# Patient Record
Sex: FEMALE | Race: BLACK OR AFRICAN AMERICAN | Employment: OTHER | URBAN - METROPOLITAN AREA
[De-identification: names, ages, dates, MRNs, and addresses within clinical notes are randomized per-mention and may not be internally consistent; named-entity substitution may affect disease eponyms.]

---

## 2017-01-20 ENCOUNTER — HOSPITAL ENCOUNTER (EMERGENCY)
Facility: HOSPITAL | Age: 52
Discharge: HOME | End: 2017-01-20
Attending: EMERGENCY MEDICINE
Payer: MEDICARE

## 2017-01-20 VITALS
OXYGEN SATURATION: 99 % | HEIGHT: 64.5 IN | TEMPERATURE: 98 F | RESPIRATION RATE: 16 BRPM | DIASTOLIC BLOOD PRESSURE: 78 MMHG | BODY MASS INDEX: 40.47 KG/M2 | HEART RATE: 68 BPM | SYSTOLIC BLOOD PRESSURE: 122 MMHG | WEIGHT: 240 LBS
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
## 2017-01-20 DIAGNOSIS — M54.50 BACK PAIN, LUMBOSACRAL: Primary | ICD-10-CM

## 2017-01-20 LAB
HBV CORE AB SERPL QL IA: NONREACTIVE
HBV SURFACE AB SER-ACNC: 4.46 MIU/ML (ref ?–10)
HBV SURFACE AG SERPL QL IA: NONREACTIVE
HBV SURFACE AG SERPL QL IA: NONREACTIVE
HIV1+2 AB SERPL QL IA: NONREACTIVE

## 2017-01-20 PROCEDURE — 87340 HEPATITIS B SURFACE AG IA: CPT | Performed by: EMERGENCY MEDICINE

## 2017-01-20 PROCEDURE — 36415 COLL VENOUS BLD VENIPUNCTURE: CPT

## 2017-01-20 PROCEDURE — 87389 HIV-1 AG W/HIV-1&-2 AB AG IA: CPT | Performed by: EMERGENCY MEDICINE

## 2017-01-20 PROCEDURE — 86704 HEP B CORE ANTIBODY TOTAL: CPT | Performed by: EMERGENCY MEDICINE

## 2017-01-20 PROCEDURE — 87491 CHLMYD TRACH DNA AMP PROBE: CPT | Performed by: EMERGENCY MEDICINE

## 2017-01-20 PROCEDURE — 87591 N.GONORRHOEAE DNA AMP PROB: CPT | Performed by: EMERGENCY MEDICINE

## 2017-01-20 PROCEDURE — 99285 EMERGENCY DEPT VISIT HI MDM: CPT

## 2017-01-20 PROCEDURE — 86706 HEP B SURFACE ANTIBODY: CPT | Performed by: EMERGENCY MEDICINE

## 2017-01-20 PROCEDURE — 80500 HEPATITIS B PROFILE: CPT | Performed by: EMERGENCY MEDICINE

## 2017-01-20 PROCEDURE — 86780 TREPONEMA PALLIDUM: CPT | Performed by: EMERGENCY MEDICINE

## 2017-01-20 RX ORDER — METHYLPREDNISOLONE 4 MG/1
TABLET ORAL
Qty: 1 PACKAGE | Refills: 0 | Status: SHIPPED | OUTPATIENT
Start: 2017-01-20

## 2017-01-20 RX ORDER — HYDROCODONE BITARTRATE AND ACETAMINOPHEN 5; 325 MG/1; MG/1
1-2 TABLET ORAL EVERY 6 HOURS PRN
Qty: 15 TABLET | Refills: 0 | Status: SHIPPED | OUTPATIENT
Start: 2017-01-20

## 2017-01-20 RX ORDER — CYCLOBENZAPRINE HCL 10 MG
10 TABLET ORAL 3 TIMES DAILY PRN
Qty: 20 TABLET | Refills: 0 | Status: SHIPPED | OUTPATIENT
Start: 2017-01-20 | End: 2017-01-27
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
## 2017-01-20 NOTE — ED NOTES
Labs drawnb and sent. Urine tox consent signed by patient. Discharge instructions explained to Pt. Pt states understanding. Denies questions.
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
## 2017-01-22 LAB
C TRACH DNA SPEC QL NAA+PROBE: NEGATIVE
N GONORRHOEA DNA SPEC QL NAA+PROBE: NEGATIVE
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
## 2017-01-23 LAB — T PALLIDUM AB SER QL: NEGATIVE

## 2017-02-08 ENCOUNTER — HOSPITAL ENCOUNTER (OUTPATIENT)
Age: 52
Discharge: ANOTHER HOSPITAL | End: 2017-02-08
Payer: MEDICARE

## 2017-02-08 VITALS
WEIGHT: 230 LBS | RESPIRATION RATE: 22 BRPM | BODY MASS INDEX: 39.27 KG/M2 | HEIGHT: 64 IN | DIASTOLIC BLOOD PRESSURE: 52 MMHG | TEMPERATURE: 98 F | OXYGEN SATURATION: 99 % | HEART RATE: 59 BPM | SYSTOLIC BLOOD PRESSURE: 140 MMHG

## 2017-02-08 DIAGNOSIS — R10.9 ABDOMINAL PAIN, ACUTE: Primary | ICD-10-CM

## 2017-02-08 PROCEDURE — 99215 OFFICE O/P EST HI 40 MIN: CPT

## 2017-02-08 NOTE — ED INITIAL ASSESSMENT (HPI)
2 days of \"grayish\" vaginal discharge with foul odor. Also c/o LLQ pain with chills. No fever. C/o urinary frequency. Denies dysuria. No nausea or vomiting. Hx of IBS but having an increase in symptoms.
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
## 2018-05-15 NOTE — CM/SW NOTE
Dr Laquita Medrano from [facility] and LUCITA Cardoza sent over a request for medical records. Once received, requested records faxed over for continuity of care.

## 2020-07-13 ENCOUNTER — HOSPITAL ENCOUNTER (EMERGENCY)
Facility: HOSPITAL | Age: 55
Discharge: LEFT BEFORE DISCHARGE | End: 2020-07-13
Attending: EMERGENCY MEDICINE
Payer: MEDICARE

## 2020-07-13 VITALS
WEIGHT: 193 LBS | BODY MASS INDEX: 32.95 KG/M2 | SYSTOLIC BLOOD PRESSURE: 150 MMHG | OXYGEN SATURATION: 100 % | TEMPERATURE: 97 F | DIASTOLIC BLOOD PRESSURE: 83 MMHG | RESPIRATION RATE: 18 BRPM | HEIGHT: 64 IN | HEART RATE: 46 BPM
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
## 2020-09-08 ENCOUNTER — HOSPITAL ENCOUNTER (EMERGENCY)
Facility: HOSPITAL | Age: 55
Discharge: HOME | End: 2020-09-09
Payer: MEDICARE

## 2020-09-08 ENCOUNTER — APPOINTMENT (OUTPATIENT)
Dept: GENERAL RADIOLOGY | Facility: HOSPITAL | Age: 55
End: 2020-09-08
Payer: MEDICARE

## 2020-09-08 DIAGNOSIS — R07.9 CHEST PAIN, UNSPECIFIED TYPE: Primary | ICD-10-CM

## 2020-09-08 DIAGNOSIS — J02.0 STREPTOCOCCAL SORE THROAT: ICD-10-CM

## 2020-09-08 LAB
BASOPHILS # BLD AUTO: 0.05 X10(3) UL (ref 0–0.2)
BASOPHILS NFR BLD AUTO: 0.7 %
DEPRECATED RDW RBC AUTO: 44.9 FL (ref 35.1–46.3)
EOSINOPHIL # BLD AUTO: 0.05 X10(3) UL (ref 0–0.7)
EOSINOPHIL NFR BLD AUTO: 0.7 %
ERYTHROCYTE [DISTWIDTH] IN BLOOD BY AUTOMATED COUNT: 14.6 % (ref 11–15)
HCT VFR BLD AUTO: 41.1 % (ref 35–48)
HGB BLD-MCNC: 14 G/DL (ref 12–16)
IMM GRANULOCYTES # BLD AUTO: 0.02 X10(3) UL (ref 0–1)
IMM GRANULOCYTES NFR BLD: 0.3 %
LYMPHOCYTES # BLD AUTO: 2.61 X10(3) UL (ref 1–4)
LYMPHOCYTES NFR BLD AUTO: 35.8 %
MCH RBC QN AUTO: 28.8 PG (ref 26–34)
MCHC RBC AUTO-ENTMCNC: 34.1 G/DL (ref 31–37)
MCV RBC AUTO: 84.6 FL (ref 80–100)
MONOCYTES # BLD AUTO: 0.41 X10(3) UL (ref 0.1–1)
MONOCYTES NFR BLD AUTO: 5.6 %
NEUTROPHILS # BLD AUTO: 4.15 X10 (3) UL (ref 1.5–7.7)
NEUTROPHILS # BLD AUTO: 4.15 X10(3) UL (ref 1.5–7.7)
NEUTROPHILS NFR BLD AUTO: 56.9 %
PLATELET # BLD AUTO: 264 10(3)UL (ref 150–450)
RBC # BLD AUTO: 4.86 X10(6)UL (ref 3.8–5.3)
WBC # BLD AUTO: 7.3 X10(3) UL (ref 4–11)

## 2020-09-08 PROCEDURE — 96360 HYDRATION IV INFUSION INIT: CPT

## 2020-09-08 PROCEDURE — 85025 COMPLETE CBC W/AUTO DIFF WBC: CPT

## 2020-09-08 PROCEDURE — 93005 ELECTROCARDIOGRAM TRACING: CPT

## 2020-09-08 PROCEDURE — 93010 ELECTROCARDIOGRAM REPORT: CPT | Performed by: INTERNAL MEDICINE

## 2020-09-08 PROCEDURE — 99284 EMERGENCY DEPT VISIT MOD MDM: CPT

## 2020-09-08 PROCEDURE — 80048 BASIC METABOLIC PNL TOTAL CA: CPT

## 2020-09-08 PROCEDURE — 71045 X-RAY EXAM CHEST 1 VIEW: CPT

## 2020-09-08 PROCEDURE — 84484 ASSAY OF TROPONIN QUANT: CPT

## 2020-09-08 RX ORDER — MULTIVIT-MIN/IRON/FOLIC ACID/K 18-600-40
CAPSULE ORAL
COMMUNITY

## 2020-09-08 RX ORDER — ONDANSETRON 2 MG/ML
4 INJECTION INTRAMUSCULAR; INTRAVENOUS ONCE
Status: DISCONTINUED | OUTPATIENT
Start: 2020-09-08 | End: 2020-09-09

## 2020-09-08 RX ORDER — ASPIRIN 81 MG/1
81 TABLET, CHEWABLE ORAL DAILY
COMMUNITY

## 2020-09-09 VITALS
DIASTOLIC BLOOD PRESSURE: 60 MMHG | WEIGHT: 193 LBS | RESPIRATION RATE: 18 BRPM | OXYGEN SATURATION: 98 % | TEMPERATURE: 98 F | SYSTOLIC BLOOD PRESSURE: 115 MMHG | HEIGHT: 64 IN | BODY MASS INDEX: 32.95 KG/M2 | HEART RATE: 52 BPM

## 2020-09-09 LAB
ANION GAP SERPL CALC-SCNC: 8 MMOL/L (ref 0–18)
BUN BLD-MCNC: 6 MG/DL (ref 7–18)
BUN/CREAT SERPL: 7.4 (ref 10–20)
CALCIUM BLD-MCNC: 9.7 MG/DL (ref 8.5–10.1)
CHLORIDE SERPL-SCNC: 109 MMOL/L (ref 98–112)
CO2 SERPL-SCNC: 24 MMOL/L (ref 21–32)
CREAT BLD-MCNC: 0.81 MG/DL (ref 0.55–1.02)
GLUCOSE BLD-MCNC: 75 MG/DL (ref 70–99)
OSMOLALITY SERPL CALC.SUM OF ELEC: 288 MOSM/KG (ref 275–295)
POTASSIUM SERPL-SCNC: 3 MMOL/L (ref 3.5–5.1)
S PYO AG THROAT QL: POSITIVE
SODIUM SERPL-SCNC: 141 MMOL/L (ref 136–145)
TROPONIN I SERPL-MCNC: <0.045 NG/ML (ref ?–0.04)
TROPONIN I SERPL-MCNC: <0.045 NG/ML (ref ?–0.04)

## 2020-09-09 PROCEDURE — 93010 ELECTROCARDIOGRAM REPORT: CPT | Performed by: NURSE PRACTITIONER

## 2020-09-09 PROCEDURE — 84484 ASSAY OF TROPONIN QUANT: CPT | Performed by: NURSE PRACTITIONER

## 2020-09-09 PROCEDURE — 93005 ELECTROCARDIOGRAM TRACING: CPT

## 2020-09-09 PROCEDURE — 87430 STREP A AG IA: CPT

## 2020-09-09 RX ORDER — AMOXICILLIN 500 MG/1
500 TABLET, FILM COATED ORAL 2 TIMES DAILY
Qty: 20 TABLET | Refills: 0 | Status: SHIPPED | OUTPATIENT
Start: 2020-09-09 | End: 2020-09-19

## 2020-09-09 NOTE — ED NOTES
Discharge instruction given to pt, pt verbalized understanding. All questions answered. Pt stable at this time.

## 2020-09-09 NOTE — ED PROVIDER NOTES
Patient Seen in: [facility] AND [facility] Emergency Department      History   Patient presents with:  Chest Pain Angina    Stated Complaint: chest pain    54yo/f with hx of bradycardia, hyperlipidemia, IBS, CAD, migraines reports to the ED with 1 day of chest p and reactive to light. Neck:      Musculoskeletal: Normal range of motion and neck supple. Cardiovascular:      Rate and Rhythm: Normal rate and regular rhythm. Heart sounds: Normal heart sounds.    Pulmonary:      Effort: Pulmonary effort is walter [id number]  Physician:  NONSTAFF  YOB: 1965    Past Medical History (entered by Technologist):  History of bradycardia. Reason For Exam (entered by Technologist):  SOB and chest pain tonight.   Other Notes (entered by Technologist): er obtain basic health screening including reassessment of your blood pressure.       Medications Prescribed:  Discharge Medication List as of 9/9/2020  1:50 AM    START taking these medications    amoxicillin 500 MG Oral Tab  Take 1 tablet (500 mg total) by m

## 2020-09-09 NOTE — ED PROVIDER NOTES
Patient signed out to me by Faraz Done patient's second troponin EKG within normal limits. Patient tested positive for strep pharyngitis. Patient sent home with a prescription of amoxicillin to take for the next 10 days.

## 2021-10-13 ENCOUNTER — HOSPITAL ENCOUNTER (EMERGENCY)
Facility: HOSPITAL | Age: 56
Discharge: HOME | End: 2021-10-13
Payer: MEDICARE

## 2021-10-13 VITALS
TEMPERATURE: 98 F | HEIGHT: 65 IN | OXYGEN SATURATION: 100 % | WEIGHT: 189 LBS | DIASTOLIC BLOOD PRESSURE: 79 MMHG | RESPIRATION RATE: 20 BRPM | HEART RATE: 50 BPM | BODY MASS INDEX: 31.49 KG/M2 | SYSTOLIC BLOOD PRESSURE: 133 MMHG

## 2021-10-13 DIAGNOSIS — H92.02 LEFT EAR PAIN: Primary | ICD-10-CM

## 2021-10-13 DIAGNOSIS — J02.9 SORE THROAT: ICD-10-CM

## 2021-10-13 PROCEDURE — 99282 EMERGENCY DEPT VISIT SF MDM: CPT

## 2021-10-13 PROCEDURE — 87880 STREP A ASSAY W/OPTIC: CPT

## 2021-10-13 RX ORDER — ACETAMINOPHEN 500 MG
1000 TABLET ORAL ONCE
Status: COMPLETED | OUTPATIENT
Start: 2021-10-13 | End: 2021-10-13
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
## 2022-03-15 ENCOUNTER — APPOINTMENT (OUTPATIENT)
Dept: CT IMAGING | Facility: HOSPITAL | Age: 57
End: 2022-03-15
Attending: EMERGENCY MEDICINE
Payer: MEDICARE

## 2022-03-15 PROBLEM — F25.9 SCHIZOAFFECTIVE DISORDER, SUBCHRONIC CONDITION WITH ACUTE EXACERBATION (HCC): Status: ACTIVE | Noted: 2022-03-15

## 2022-03-15 PROCEDURE — 70450 CT HEAD/BRAIN W/O DYE: CPT | Performed by: EMERGENCY MEDICINE

## 2022-03-15 NOTE — ED QUICK NOTES
Raine LYMAN spoke with Heidi [phone], [employer] and confirmed that [employer] will be sent out to  kit.

## 2022-03-15 NOTE — ED PROVIDER NOTES
Patient endorsed to me in setting of paranoia. Pt became agitated and unable to de-escalate verbally. Pt given IM zyprexa. Awaiting psychiatric transfer.

## 2022-03-15 NOTE — ED QUICK NOTES
Dr Munguia made aware that pt did not eat any breakfast or lunch.  Daughter Khoa Grier is also aware that her mother is not eating and drinking, Tonio is coming to spend dinner with her mother
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
## 2022-03-15 NOTE — ED QUICK NOTES
Pt agitated after being made aware of plan to transfer. Pt given Zyprexa. Pt placed in hard restraints. Security and staff at bedside. Family asked to go in waiting area by LUCITA Cheng [employer]ighb[employer] & family declined.
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
## 2022-03-16 PROBLEM — F33.3 SEVERE RECURRENT MAJOR DEPRESSION WITH PSYCHOTIC FEATURES (HCC): Status: ACTIVE | Noted: 2022-03-16

## 2022-03-16 PROBLEM — F20.0 SCHIZOPHRENIA, PARANOID (HCC): Status: ACTIVE | Noted: 2022-03-16

## 2022-03-16 NOTE — ED QUICK NOTES
Attempted to give report to [facility] nurse, but she was unavailable. Awaiting call back. Nurse ext.  [phone]

## 2022-03-16 NOTE — ED QUICK NOTES
Report given to [employer] at [facility]. Accepting physician DR. Weir. Daughters updated on plan.

## 2022-03-16 NOTE — ED PROVIDER NOTES
Patient endorsed to me from previous physician. Reported [facility] psychiatrist request CT scan of head be performed. CT BRAIN OR HEAD (35254)    Result Date: 3/15/2022  CONCLUSION:  1. No acute intracranial process. 2.  Mild inflammation in the left maxillary sinus. 3.  Atherosclerosis in the anterior and posterior circulations.   Dictated by (CST): Dayana Tanner MD on 3/15/2022 at 6:52 PM     Finalized by (CST): Dayana Tanner MD on 3/15/2022 at 6:54 PM

## 2022-03-19 PROBLEM — R00.1 SINUS BRADYCARDIA: Status: ACTIVE | Noted: 2022-03-19

## 2022-04-02 PROBLEM — F33.3 SEVERE RECURRENT MAJOR DEPRESSION WITH PSYCHOTIC FEATURES (HCC): Status: RESOLVED | Noted: 2022-03-16 | Resolved: 2022-04-02

## 2022-04-02 PROBLEM — F25.9 SCHIZOAFFECTIVE DISORDER, SUBCHRONIC CONDITION WITH ACUTE EXACERBATION (HCC): Chronic | Status: ACTIVE | Noted: 2022-03-15

## 2022-08-17 ENCOUNTER — HOSPITAL ENCOUNTER (EMERGENCY)
Facility: HOSPITAL | Age: 57
Discharge: HOME | End: 2022-08-17
Attending: EMERGENCY MEDICINE
Payer: MEDICARE

## 2022-08-17 VITALS
HEART RATE: 68 BPM | BODY MASS INDEX: 34.15 KG/M2 | OXYGEN SATURATION: 100 % | WEIGHT: 200 LBS | HEIGHT: 64 IN | DIASTOLIC BLOOD PRESSURE: 74 MMHG | SYSTOLIC BLOOD PRESSURE: 135 MMHG | TEMPERATURE: 98 F | RESPIRATION RATE: 15 BRPM

## 2022-08-17 DIAGNOSIS — R23.8 VESICLE OF SKIN: Primary | ICD-10-CM

## 2022-08-17 PROCEDURE — 99283 EMERGENCY DEPT VISIT LOW MDM: CPT

## 2022-08-17 NOTE — ED INITIAL ASSESSMENT (HPI)
Pt c/o of rashes and blisters on BLE and back after working in her back yard yesterday. No respiratory symptoms.

## 2023-03-27 ENCOUNTER — HOSPITAL ENCOUNTER (OUTPATIENT)
Age: 58
Discharge: ANOTHER HOSPITAL | End: 2023-03-27
Payer: MEDICARE

## 2023-03-27 ENCOUNTER — HOSPITAL ENCOUNTER (EMERGENCY)
Facility: HOSPITAL | Age: 58
Discharge: HOME | End: 2023-03-27
Payer: MEDICARE

## 2023-03-27 ENCOUNTER — APPOINTMENT (OUTPATIENT)
Dept: CT IMAGING | Facility: HOSPITAL | Age: 58
End: 2023-03-27
Attending: NURSE PRACTITIONER
Payer: MEDICARE

## 2023-03-27 VITALS
TEMPERATURE: 99 F | DIASTOLIC BLOOD PRESSURE: 74 MMHG | BODY MASS INDEX: 32.44 KG/M2 | WEIGHT: 190 LBS | HEIGHT: 64 IN | SYSTOLIC BLOOD PRESSURE: 130 MMHG | OXYGEN SATURATION: 98 % | RESPIRATION RATE: 15 BRPM | HEART RATE: 42 BPM

## 2023-03-27 VITALS
DIASTOLIC BLOOD PRESSURE: 83 MMHG | OXYGEN SATURATION: 97 % | RESPIRATION RATE: 16 BRPM | SYSTOLIC BLOOD PRESSURE: 130 MMHG | HEART RATE: 48 BPM | TEMPERATURE: 97 F

## 2023-03-27 DIAGNOSIS — R19.7 DIARRHEA: ICD-10-CM

## 2023-03-27 DIAGNOSIS — R10.32 LLQ ABDOMINAL PAIN: Primary | ICD-10-CM

## 2023-03-27 DIAGNOSIS — R53.83 OTHER FATIGUE: ICD-10-CM

## 2023-03-27 DIAGNOSIS — R10.9 ABDOMINAL PAIN OF UNKNOWN ETIOLOGY: Primary | ICD-10-CM

## 2023-03-27 LAB
ALBUMIN SERPL-MCNC: 3.6 G/DL (ref 3.4–5)
ALBUMIN/GLOB SERPL: 0.9 {RATIO} (ref 1–2)
ALP LIVER SERPL-CCNC: 109 U/L
ALT SERPL-CCNC: 16 U/L
ANION GAP SERPL CALC-SCNC: 5 MMOL/L (ref 0–18)
AST SERPL-CCNC: 14 U/L (ref 15–37)
BASOPHILS # BLD AUTO: 0.06 X10(3) UL (ref 0–0.2)
BASOPHILS NFR BLD AUTO: 1 %
BILIRUB SERPL-MCNC: 0.6 MG/DL (ref 0.1–2)
BILIRUB UR QL: NEGATIVE
BUN BLD-MCNC: 4 MG/DL (ref 7–18)
BUN/CREAT SERPL: 4.3 (ref 10–20)
CALCIUM BLD-MCNC: 9.2 MG/DL (ref 8.5–10.1)
CHLORIDE SERPL-SCNC: 111 MMOL/L (ref 98–112)
CLARITY UR: CLEAR
CO2 SERPL-SCNC: 27 MMOL/L (ref 21–32)
COLOR UR: YELLOW
CREAT BLD-MCNC: 0.92 MG/DL
DEPRECATED RDW RBC AUTO: 45.5 FL (ref 35.1–46.3)
EOSINOPHIL # BLD AUTO: 0.1 X10(3) UL (ref 0–0.7)
EOSINOPHIL NFR BLD AUTO: 1.7 %
ERYTHROCYTE [DISTWIDTH] IN BLOOD BY AUTOMATED COUNT: 14.6 % (ref 11–15)
GFR SERPLBLD BASED ON 1.73 SQ M-ARVRAT: 72 ML/MIN/1.73M2 (ref 60–?)
GLOBULIN PLAS-MCNC: 3.8 G/DL (ref 2.8–4.4)
GLUCOSE BLD-MCNC: 106 MG/DL (ref 70–99)
GLUCOSE UR-MCNC: NORMAL MG/DL
HCT VFR BLD AUTO: 42.5 %
HGB BLD-MCNC: 14.1 G/DL
HYALINE CASTS #/AREA URNS AUTO: PRESENT /LPF
IMM GRANULOCYTES # BLD AUTO: 0.02 X10(3) UL (ref 0–1)
IMM GRANULOCYTES NFR BLD: 0.3 %
KETONES UR-MCNC: NEGATIVE MG/DL
LEUKOCYTE ESTERASE UR QL STRIP.AUTO: NEGATIVE
LIPASE SERPL-CCNC: 51 U/L (ref 13–75)
LYMPHOCYTES # BLD AUTO: 2.38 X10(3) UL (ref 1–4)
LYMPHOCYTES NFR BLD AUTO: 39.6 %
MCH RBC QN AUTO: 28.4 PG (ref 26–34)
MCHC RBC AUTO-ENTMCNC: 33.2 G/DL (ref 31–37)
MCV RBC AUTO: 85.7 FL
MONOCYTES # BLD AUTO: 0.43 X10(3) UL (ref 0.1–1)
MONOCYTES NFR BLD AUTO: 7.2 %
NEUTROPHILS # BLD AUTO: 3.02 X10 (3) UL (ref 1.5–7.7)
NEUTROPHILS # BLD AUTO: 3.02 X10(3) UL (ref 1.5–7.7)
NEUTROPHILS NFR BLD AUTO: 50.2 %
NITRITE UR QL STRIP.AUTO: NEGATIVE
OSMOLALITY SERPL CALC.SUM OF ELEC: 293 MOSM/KG (ref 275–295)
PH UR: 5.5 [PH] (ref 5–8)
PLATELET # BLD AUTO: 271 10(3)UL (ref 150–450)
POCT INFLUENZA A: NEGATIVE
POCT INFLUENZA B: NEGATIVE
POTASSIUM SERPL-SCNC: 3.1 MMOL/L (ref 3.5–5.1)
PROT SERPL-MCNC: 7.4 G/DL (ref 6.4–8.2)
PROT UR-MCNC: 20 MG/DL
RBC # BLD AUTO: 4.96 X10(6)UL
SARS-COV-2 RNA RESP QL NAA+PROBE: NOT DETECTED
SODIUM SERPL-SCNC: 143 MMOL/L (ref 136–145)
SP GR UR STRIP: 1.03 (ref 1–1.03)
UROBILINOGEN UR STRIP-ACNC: NORMAL
WBC # BLD AUTO: 6 X10(3) UL (ref 4–11)

## 2023-03-27 PROCEDURE — 81001 URINALYSIS AUTO W/SCOPE: CPT

## 2023-03-27 PROCEDURE — 85025 COMPLETE CBC W/AUTO DIFF WBC: CPT

## 2023-03-27 PROCEDURE — 96374 THER/PROPH/DIAG INJ IV PUSH: CPT

## 2023-03-27 PROCEDURE — 80053 COMPREHEN METABOLIC PANEL: CPT

## 2023-03-27 PROCEDURE — 83690 ASSAY OF LIPASE: CPT

## 2023-03-27 PROCEDURE — 99285 EMERGENCY DEPT VISIT HI MDM: CPT

## 2023-03-27 PROCEDURE — 99215 OFFICE O/P EST HI 40 MIN: CPT | Performed by: NURSE PRACTITIONER

## 2023-03-27 PROCEDURE — 87502 INFLUENZA DNA AMP PROBE: CPT | Performed by: NURSE PRACTITIONER

## 2023-03-27 PROCEDURE — 96375 TX/PRO/DX INJ NEW DRUG ADDON: CPT

## 2023-03-27 PROCEDURE — 74177 CT ABD & PELVIS W/CONTRAST: CPT | Performed by: NURSE PRACTITIONER

## 2023-03-27 PROCEDURE — 99284 EMERGENCY DEPT VISIT MOD MDM: CPT

## 2023-03-27 PROCEDURE — U0002 COVID-19 LAB TEST NON-CDC: HCPCS | Performed by: NURSE PRACTITIONER

## 2023-03-27 RX ORDER — MORPHINE SULFATE 4 MG/ML
4 INJECTION, SOLUTION INTRAMUSCULAR; INTRAVENOUS ONCE
Status: COMPLETED | OUTPATIENT
Start: 2023-03-27 | End: 2023-03-27

## 2023-03-27 RX ORDER — ONDANSETRON 2 MG/ML
4 INJECTION INTRAMUSCULAR; INTRAVENOUS ONCE
Status: COMPLETED | OUTPATIENT
Start: 2023-03-27 | End: 2023-03-27

## 2023-03-27 NOTE — ED INITIAL ASSESSMENT (HPI)
Patient ambulatory to ED with complaint of left sided abd pain, body aches, NVD, symptoms began a few days ago. Patient is AXOX4.

## 2023-03-27 NOTE — ED INITIAL ASSESSMENT (HPI)
Patient with neck discomfort, runny nose, nausea, diarrhea and feels winded since yesterday    - emesis   - fever    No exposure to children with illness

## 2023-11-13 ENCOUNTER — LAB ENCOUNTER (OUTPATIENT)
Dept: LAB | Age: 58
End: 2023-11-13
Attending: INTERNAL MEDICINE
Payer: MEDICARE

## 2023-11-13 DIAGNOSIS — Z12.11 COLON CANCER SCREENING: ICD-10-CM

## 2023-11-13 DIAGNOSIS — E61.1 IRON DEFICIENCY: ICD-10-CM

## 2023-11-13 DIAGNOSIS — M79.7 FIBROMYALGIA: ICD-10-CM

## 2023-11-13 DIAGNOSIS — F25.8 OTHER SCHIZOAFFECTIVE DISORDERS (HCC): ICD-10-CM

## 2023-11-13 DIAGNOSIS — Z12.31 BREAST CANCER SCREENING BY MAMMOGRAM: ICD-10-CM

## 2023-11-13 DIAGNOSIS — F43.10 PTSD (POST-TRAUMATIC STRESS DISORDER): ICD-10-CM

## 2023-11-13 DIAGNOSIS — F32.3 MAJOR DEPRESSION WITH PSYCHOTIC FEATURES (HCC): ICD-10-CM

## 2023-11-13 DIAGNOSIS — E66.01 MORBID OBESITY (HCC): ICD-10-CM

## 2023-11-13 PROBLEM — J02.9 SORE THROAT: Status: ACTIVE | Noted: 2018-02-21

## 2023-11-13 PROBLEM — E78.00 PURE HYPERCHOLESTEROLEMIA: Status: ACTIVE | Noted: 2020-03-06

## 2023-11-13 PROBLEM — G25.81 RESTLESS LEG SYNDROME: Status: ACTIVE | Noted: 2020-03-06

## 2023-11-13 PROBLEM — D50.9 IRON DEFICIENCY ANEMIA: Status: ACTIVE | Noted: 2020-03-06

## 2023-11-13 PROBLEM — R00.1 BRADYCARDIA: Status: ACTIVE | Noted: 2020-03-06

## 2023-11-13 LAB
ALBUMIN SERPL-MCNC: 4.2 G/DL (ref 3.2–4.8)
ALBUMIN/GLOB SERPL: 1.5 {RATIO} (ref 1–2)
ALP LIVER SERPL-CCNC: 96 U/L
ALT SERPL-CCNC: 28 U/L
ANION GAP SERPL CALC-SCNC: 6 MMOL/L (ref 0–18)
AST SERPL-CCNC: 34 U/L (ref ?–34)
BASOPHILS # BLD AUTO: 0.07 X10(3) UL (ref 0–0.2)
BASOPHILS NFR BLD AUTO: 1.1 %
BILIRUB SERPL-MCNC: 0.4 MG/DL (ref 0.2–1.1)
BUN BLD-MCNC: 11 MG/DL (ref 9–23)
BUN/CREAT SERPL: 11.3 (ref 10–20)
CALCIUM BLD-MCNC: 10 MG/DL (ref 8.7–10.4)
CHLORIDE SERPL-SCNC: 106 MMOL/L (ref 98–112)
CHOLEST SERPL-MCNC: 265 MG/DL (ref ?–200)
CO2 SERPL-SCNC: 28 MMOL/L (ref 21–32)
CREAT BLD-MCNC: 0.97 MG/DL
DEPRECATED RDW RBC AUTO: 49 FL (ref 35.1–46.3)
EGFRCR SERPLBLD CKD-EPI 2021: 67 ML/MIN/1.73M2 (ref 60–?)
EOSINOPHIL # BLD AUTO: 0.11 X10(3) UL (ref 0–0.7)
EOSINOPHIL NFR BLD AUTO: 1.7 %
ERYTHROCYTE [DISTWIDTH] IN BLOOD BY AUTOMATED COUNT: 15.2 % (ref 11–15)
FASTING PATIENT LIPID ANSWER: NO
FASTING STATUS PATIENT QL REPORTED: NO
GLOBULIN PLAS-MCNC: 2.8 G/DL (ref 2.8–4.4)
GLUCOSE BLD-MCNC: 91 MG/DL (ref 70–99)
HCT VFR BLD AUTO: 40.5 %
HDLC SERPL-MCNC: 65 MG/DL (ref 40–59)
HGB BLD-MCNC: 13.2 G/DL
IMM GRANULOCYTES # BLD AUTO: 0.06 X10(3) UL (ref 0–1)
IMM GRANULOCYTES NFR BLD: 0.9 %
LDLC SERPL CALC-MCNC: 178 MG/DL (ref ?–100)
LYMPHOCYTES # BLD AUTO: 2.21 X10(3) UL (ref 1–4)
LYMPHOCYTES NFR BLD AUTO: 34.6 %
MCH RBC QN AUTO: 28.6 PG (ref 26–34)
MCHC RBC AUTO-ENTMCNC: 32.6 G/DL (ref 31–37)
MCV RBC AUTO: 87.9 FL
MONOCYTES # BLD AUTO: 0.71 X10(3) UL (ref 0.1–1)
MONOCYTES NFR BLD AUTO: 11.1 %
NEUTROPHILS # BLD AUTO: 3.22 X10 (3) UL (ref 1.5–7.7)
NEUTROPHILS # BLD AUTO: 3.22 X10(3) UL (ref 1.5–7.7)
NEUTROPHILS NFR BLD AUTO: 50.6 %
NONHDLC SERPL-MCNC: 200 MG/DL (ref ?–130)
OSMOLALITY SERPL CALC.SUM OF ELEC: 289 MOSM/KG (ref 275–295)
PLATELET # BLD AUTO: 292 10(3)UL (ref 150–450)
POTASSIUM SERPL-SCNC: 4 MMOL/L (ref 3.5–5.1)
PROT SERPL-MCNC: 7 G/DL (ref 5.7–8.2)
RBC # BLD AUTO: 4.61 X10(6)UL
SODIUM SERPL-SCNC: 140 MMOL/L (ref 136–145)
TRIGL SERPL-MCNC: 126 MG/DL (ref 30–149)
TSI SER-ACNC: 4.69 MIU/ML (ref 0.55–4.78)
VLDLC SERPL CALC-MCNC: 26 MG/DL (ref 0–30)
WBC # BLD AUTO: 6.4 X10(3) UL (ref 4–11)

## 2023-11-13 PROCEDURE — 36415 COLL VENOUS BLD VENIPUNCTURE: CPT

## 2023-11-13 PROCEDURE — 80061 LIPID PANEL: CPT

## 2023-11-13 PROCEDURE — 84443 ASSAY THYROID STIM HORMONE: CPT

## 2023-11-13 PROCEDURE — 83540 ASSAY OF IRON: CPT

## 2023-11-13 PROCEDURE — 84466 ASSAY OF TRANSFERRIN: CPT

## 2023-11-13 PROCEDURE — 85025 COMPLETE CBC W/AUTO DIFF WBC: CPT

## 2023-11-13 PROCEDURE — 82728 ASSAY OF FERRITIN: CPT

## 2023-11-13 PROCEDURE — 80053 COMPREHEN METABOLIC PANEL: CPT

## 2023-11-14 DIAGNOSIS — E61.1 IRON DEFICIENCY: Primary | ICD-10-CM

## 2023-11-14 LAB
DEPRECATED HBV CORE AB SER IA-ACNC: 78.2 NG/ML
IRON SATN MFR SERPL: 29 %
IRON SERPL-MCNC: 91 UG/DL
TIBC SERPL-MCNC: 317 UG/DL (ref 250–425)
TRANSFERRIN SERPL-MCNC: 213 MG/DL (ref 250–380)

## 2023-11-22 ENCOUNTER — OFFICE VISIT (OUTPATIENT)
Dept: RHEUMATOLOGY | Facility: CLINIC | Age: 58
End: 2023-11-22

## 2023-11-22 VITALS
HEART RATE: 60 BPM | WEIGHT: 211 LBS | HEIGHT: 64 IN | SYSTOLIC BLOOD PRESSURE: 119 MMHG | DIASTOLIC BLOOD PRESSURE: 75 MMHG | BODY MASS INDEX: 36.02 KG/M2

## 2023-11-22 DIAGNOSIS — M79.7 FIBROMYALGIA: Primary | ICD-10-CM

## 2023-11-22 PROCEDURE — 99214 OFFICE O/P EST MOD 30 MIN: CPT | Performed by: INTERNAL MEDICINE

## 2023-11-22 RX ORDER — CYCLOBENZAPRINE HCL 5 MG
5 TABLET ORAL NIGHTLY PRN
Qty: 30 TABLET | Refills: 2 | Status: SHIPPED | OUTPATIENT
Start: 2023-11-22

## 2023-11-22 NOTE — PROGRESS NOTES
[address] [facility] Chaitanya Lam is a 59year old female. ASSESSMENT/PLAN:       ICD-10-CM    1. Fibromyalgia  M79.7 Physical Therapy Referral - [facility]        DISCUSSION:  Patient presents as a new outpatient consultation for diffuse muscle spasms and polyarthralgias. At this time, patient without signs or features of an underlying connective tissue disease nor inflammatory arthritis. Discussed with patient that fibromyalgia requires a multi-disciplinary approach including formal physical therapy and symptoms-based treatment such as muscle relaxants in the case of muscle spasms or gabapentin in the case of neuropathy/radiculopathy. I have also seen trials of various NSAIDs to be helpful for some patients. This patient has a complicated history, given her history of side effects to gabapentin and pregabalin, therefore would avoid these treatment options. Would also avoid NSAIDs given her history of CAD with angioplasty. Discussed formal physical therapy with patient to which she is agreeable. PLAN:  -Prescription for formal physical therapy printed for patient  - Would avoid NSAIDs given patient's history of CAD with angioplasty  - Patient has already trialed gabapentin and pregabalin with noted side effects  - It would be reasonable to reintroduce Flexeril at a low-dose to see if this helps some of her symptoms    Patient may follow-up as needed    Vanessa Wong DO  11/22/2023   3:19 PM    HPI:     Chief Complaint   Patient presents with    Knee Pain    Foot Pain    Back Pain     Lower back       I had the pleasure of seeing Jaida Morgan on 11/22/2023 as a new outpatient consultation for polyarthralgias. The patient was originally referred by her PCP, Dr. Javier Dawn. 59year old female w/ PMH fibromyalgia, IBS, CAD s/p angioplasty 2015 who presents to clinic for autoimmune evaluation.   Patient reporting multiple polyarthralgia but especially noting diffuse muscle spasms affecting her arms and legs. ROS negative for fever, chills, rash, photosensitivity, sicca symptoms, serositis, oral/nasal ulcers. Family history notable for a sister and nephew with multiple sclerosis. Medication History:  Gabapentin  Lyrica - swelling, weight gain   Cyclobenzaprine- self-discontinued d/t concern of side effects     Physical Therapy- some improvement         HISTORY:  Past Medical History:   Diagnosis Date    Arrhythmia     Fibromyalgia     Irritable bowel syndrome     Migraines       Social Hx Reviewed   Family Hx Reviewed     Medications (Active prior to today's visit):  Current Outpatient Medications   Medication Sig Dispense Refill    cholecalciferol (VITAMIN D3) 10 MCG (400 UNIT) Oral Tab Take 1 tablet (400 Units total) by mouth daily. fluticasone propionate 50 MCG/ACT Nasal Suspension 1 spray by Nasal route daily. aspirin 81 MG Oral Chew Tab Chew 1 tablet (81 mg total) by mouth daily. Allergies: Allergies   Allergen Reactions    Haloperidol NAUSEA AND VOMITING    Ziprasidone ITCHING    Zolmitriptan NAUSEA ONLY         ROS:   Review of Systems   Constitutional:  Negative for chills and fever. HENT:  Negative for congestion, hearing loss, mouth sores, nosebleeds and trouble swallowing. Eyes:  Negative for photophobia, pain, redness and visual disturbance. Respiratory:  Negative for cough and shortness of breath. Cardiovascular:  Negative for chest pain, palpitations and leg swelling. Gastrointestinal:  Negative for abdominal pain, blood in stool, diarrhea and nausea. Endocrine: Negative for cold intolerance and heat intolerance. Genitourinary:  Negative for dysuria, frequency and hematuria. Musculoskeletal:  Positive for arthralgias, myalgias and neck pain. Negative for back pain, gait problem, joint swelling and neck stiffness. Skin:  Negative for color change and rash.    Neurological:  Negative for dizziness, weakness, numbness and headaches. Psychiatric/Behavioral:  Negative for confusion and dysphoric mood. PHYSICAL EXAM:     Constitutional:  Well developed, Well nourished, No acute distress  HENT:  Normocephalic, Atraumatic, Bilateral external ears normal, Oropharynx moist, No oral exudates. Neck: Normal range of motion, No tenderness, Supple, No stridor. Eyes:  PERRL, EOMI, Conjunctiva normal, No discharge. Respiratory:  Normal breath sounds, No respiratory distress, No wheezing. Cardiovascular:  Normal heart rate, Normal rhythm, No murmurs, No rubs, No gallops. GI:  Bowel sounds normal, Soft, No tenderness, No masses, No pulsatile masses. : No CVA tenderness. Musculoskeletal: A comprehensive 28 count joint exam was done and was negative for swelling or tenderness except as noted. Inspections for misalignment, asymmetry, crepitation, defects, tenderness, masses, nodules, effusions, range of motion, and stability in the upper and lower extremities bilaterally are all normal unless noted. Integument:  Warm, Dry, No erythema, No rash. Lymphatic:  No lymphadenopathy noted. Neurologic:  Alert & oriented x 3, Normal motor function, Normal sensory function, No focal deficits noted.   Psychiatric:  Affect normal, Judgment normal, Mood normal.    LABS:   Prior lab work reviewed and notable for:    11/13/2023    CBC without cytopenias nor leukocytosis  CMP with normal renal and hepatic function  TSH 4.687 WNL  Ferritin 78.2 WNL  FE 91, TIBC 317  Imaging:     N/A

## 2023-11-27 PROBLEM — F29 UNSPECIFIED PSYCHOSIS NOT DUE TO A SUBSTANCE OR KNOWN PHYSIOLOGICAL CONDITION (HCC): Status: ACTIVE | Noted: 2023-09-08

## 2023-11-27 PROBLEM — E55.9 VITAMIN D DEFICIENCY: Status: ACTIVE | Noted: 2023-08-27

## 2023-11-29 ENCOUNTER — TELEPHONE (OUTPATIENT)
Dept: RHEUMATOLOGY | Facility: CLINIC | Age: 58
End: 2023-11-29

## 2023-11-29 NOTE — TELEPHONE ENCOUNTER
Patient, states that she saw Dr. Sherlyn Sifuentes for fibromyalgia, per the patient the doctor informed her that she does not specialize in fibromyalgia. Pt would like to schedule an appointment with another Rheumatologist, but, would like to confirm that they do specialize with fibromyalgia. Please, call the patient.

## 2023-11-29 NOTE — TELEPHONE ENCOUNTER
Dr [facility]-- see note below. Per LOV note on 11/22/2023 states the following: \"This patient has a complicated history, given her history of side effects to gabapentin and pregabalin, therefore would avoid these treatment options. Would also avoid NSAIDs given her history of CAD with angioplasty. Discussed formal physical therapy with patient to which she is agreeable. \"     Please advise.

## 2023-11-30 NOTE — TELEPHONE ENCOUNTER
Pt returned call. Began relaying information from Dr Trenton Mahan that patient could see Dr Brennan Resendez in [facility] who is a local expert in fibromyalgia. Before I could provide additional information, patient stated \"oh no, I do not want a male doctor; I don't do well with them. \" Patient went on to say she is not looking for an \"expert\" in fibromyalgia, just a doctor who treats fibromyalgia. Patient than asked if Dr Indira Barbour or Frantz Must treat fibromyalgia; I informed her yes. Patient requested first available appointment with either.  Scheduled patient with Dr Frantz Mccracken on 2/20/24 with Dr Frantz Mccracken; also put patient on wait list.

## 2023-11-30 NOTE — TELEPHONE ENCOUNTER
LM on unidentified message to call office back for information she requested regarding a rheumatologist. Call back number provided.

## 2023-12-11 ENCOUNTER — OFFICE VISIT (OUTPATIENT)
Dept: INTERNAL MEDICINE CLINIC | Facility: CLINIC | Age: 58
End: 2023-12-11

## 2023-12-11 VITALS
DIASTOLIC BLOOD PRESSURE: 70 MMHG | OXYGEN SATURATION: 98 % | WEIGHT: 220.81 LBS | SYSTOLIC BLOOD PRESSURE: 126 MMHG | BODY MASS INDEX: 37.7 KG/M2 | HEIGHT: 64 IN | HEART RATE: 51 BPM

## 2023-12-11 DIAGNOSIS — E78.2 MIXED HYPERLIPIDEMIA: Primary | ICD-10-CM

## 2023-12-11 DIAGNOSIS — F22 PARANOID DELUSION (HCC): ICD-10-CM

## 2023-12-11 DIAGNOSIS — R82.90 BAD ODOR OF URINE: ICD-10-CM

## 2023-12-11 DIAGNOSIS — Z12.11 COLON CANCER SCREENING: ICD-10-CM

## 2023-12-11 DIAGNOSIS — M79.7 FIBROMYALGIA: ICD-10-CM

## 2023-12-11 DIAGNOSIS — R73.03 PREDIABETES: ICD-10-CM

## 2023-12-11 LAB
APPEARANCE: CLEAR
BILIRUBIN: NEGATIVE
GLUCOSE (URINE DIPSTICK): NEGATIVE MG/DL
KETONES (URINE DIPSTICK): NEGATIVE MG/DL
LEUKOCYTES: NEGATIVE
MULTISTIX LOT#: ABNORMAL NUMERIC
NITRITE, URINE: NEGATIVE
PH, URINE: 5 (ref 4.5–8)
PROTEIN (URINE DIPSTICK): NEGATIVE MG/DL
SPECIFIC GRAVITY: 1.03 (ref 1–1.03)
URINE-COLOR: YELLOW
UROBILINOGEN,SEMI-QN: 0.2 MG/DL (ref 0–1.9)

## 2023-12-11 PROCEDURE — 99214 OFFICE O/P EST MOD 30 MIN: CPT | Performed by: INTERNAL MEDICINE

## 2023-12-11 PROCEDURE — 81002 URINALYSIS NONAUTO W/O SCOPE: CPT | Performed by: INTERNAL MEDICINE

## 2023-12-11 RX ORDER — METFORMIN HYDROCHLORIDE 500 MG/1
500 TABLET, EXTENDED RELEASE ORAL 2 TIMES DAILY WITH MEALS
Qty: 180 TABLET | Refills: 0 | Status: SHIPPED | OUTPATIENT
Start: 2023-12-11 | End: 2024-03-10

## 2023-12-11 RX ORDER — FLUCONAZOLE 150 MG/1
150 TABLET ORAL ONCE
Qty: 1 TABLET | Refills: 0 | Status: SHIPPED | OUTPATIENT
Start: 2023-12-11 | End: 2023-12-11

## 2023-12-11 RX ORDER — ROSUVASTATIN CALCIUM 10 MG/1
10 TABLET, COATED ORAL NIGHTLY
Qty: 90 TABLET | Refills: 1 | Status: SHIPPED | OUTPATIENT
Start: 2023-12-11 | End: 2024-06-08

## 2023-12-11 NOTE — PROGRESS NOTES
Christiano Marroquin is a 59year old female who is here for  1. Mixed hyperlipidemia    2. Prediabetes    3. Fibromyalgia    4. Paranoid delusion ([address]    5. Colon cancer screening    6. Bad odor of urine          HPI:   Christiano Marroquin is a 59year old female presents with follow up for Fibromyalgia. -Have seen multiple neurologists and rheumatologists for her fibromyalgia but she said \"nobody is willing to treat her fibromyalgia\". -She tried Lyrica (gained up to 400 lbs) and gabapentin with (itching/vomiting) side effects for both  -NSAIDS is contraindicated given hx of CAD and angioplasty  -Fluphenazine caused shaking(she states that she followed up with psych, and recommended keep taking it and told her \"benefit outweighs the risk\"). -Patient works for [employer] and has been on long term medical leave since 2009, when Jeronimo gonsales she stopped going to the doctors. She needs another completion of her form. Past Medical History:   Diagnosis Date    Arrhythmia     Fibromyalgia     Irritable bowel syndrome     Migraines      Past Surgical History:   Procedure Laterality Date    BREAST SURGERY      TOTAL ABDOM HYSTERECTOMY         Current Outpatient Medications:     rosuvastatin 10 MG Oral Tab, Take 1 tablet (10 mg total) by mouth nightly., Disp: 90 tablet, Rfl: 1    metFORMIN  MG Oral Tablet 24 Hr, Take 1 tablet (500 mg total) by mouth 2 (two) times daily with meals. , Disp: 180 tablet, Rfl: 0    fluconazole 150 MG Oral Tab, Take 1 tablet (150 mg total) by mouth once for 1 dose., Disp: 1 tablet, Rfl: 0    fluPHENAZine 5 MG Oral Tab, Take 1 tablet (5 mg total) by mouth 2 (two) times daily. , Disp: , Rfl:     benztropine 0.5 MG Oral Tab, Take 1 tablet (0.5 mg total) by mouth every 4 (four) hours as needed. , Disp: , Rfl:     cyclobenzaprine 5 MG Oral Tab, Take 1 tablet (5 mg total) by mouth nightly as needed for Muscle spasms. , Disp: 30 tablet, Rfl: 2    cholecalciferol (VITAMIN D3) 10 MCG (400 UNIT) Oral Tab, Take 1 tablet (400 Units total) by mouth daily. , Disp: , Rfl:     fluticasone propionate 50 MCG/ACT Nasal Suspension, 1 spray by Nasal route daily. , Disp: , Rfl:     aspirin 81 MG Oral Chew Tab, Chew 1 tablet (81 mg total) by mouth daily. , Disp: , Rfl:     Allergies:   Allergies   Allergen Reactions    Haloperidol NAUSEA AND VOMITING    Ziprasidone ITCHING    Zolmitriptan NAUSEA ONLY     Social History     Socioeconomic History    Marital status: Single     Spouse name: Not on file    Number of children: Not on file    Years of education: Not on file    Highest education level: Not on file   Occupational History    Not on file   Tobacco Use    Smoking status: Never    Smokeless tobacco: Never   Vaping Use    Vaping Use: Never used   Substance and Sexual Activity    Alcohol use: Yes     Comment: socially     Drug use: Never    Sexual activity: Not on file   Other Topics Concern    Not on file   Social History Narrative    Not on file     Social Determinants of Health     Financial Resource Strain: Not on file   Food Insecurity: Not on file   Transportation Needs: Not on file   Physical Activity: Not on file   Stress: Not on file   Social Connections: Not on file   Housing Stability: Not on file       REVIEW OF SYSTEMS:     GENERAL HEALTH: No fevers, chills, sweats, fatigue  VISION: No recent vision problems, blurry vision or double vision  HEENT: No decreased hearing ear pain nasal congestion or sore throat  SKIN: denies any unusual skin lesions or rashes  RESPIRATORY: denies shortness of breath, cough, wheezing  CARDIOVASCULAR: denies chest pain on exertion, palpitations, swelling in feet  GI: denies abdominal pain and denies heartburn, nausea or vomiting  : No Pain on urination, change in the color of urine, discharge, urinating frequently  MUS: + back pain, joint pain, muscle pain  NEURO: denies headaches , anxiety, depression    EXAM:     Vitals:    12/11/23 0858   BP: 126/70   Pulse: 51   SpO2: 98% Weight: 220 lb 12.8 oz (100.2 kg)   Height: 5' 4\" (1.626 m)     GENERAL: well developed, well nourished,in no apparent distress  SKIN: no rashes,no suspicious lesions  HEENT: atraumatic, normocephalic,ears and throat are clear,   NECK: supple,no adenopathy,  LUNGS: clear to auscultation, no wheeze  CARDIO: RRR without murmur  GI: good BS's,no masses or tenderness  EXTREMITIES: no cyanosis, or edema    ASSESSMENT AND PLAN:   1. Mixed hyperlipidemia  - start rosuvastatin     2. Prediabetes  -A1c 5.8 8/2023  Plan:  -start metformin 500 BID  -RTO 3 months    3. Fibromyalgia  -seen Dr. Shanell Hallman ([facility])    4. Paranoid delusion ([address]  -multiple psych problems in hx  -patient with mulitple side effects to suggested medications  -used to follow up in [address] but I do not have records  -recent hospitalization for paranoia and patient declined medications due to side effects  Plan:  - OP REFERRAL TO [facility]    5. Colon cancer screening  - Occult Blood, Fecal, Immunoassay (Blue cards) [E]; Future    6. Bad odor of urine  - URINALYSIS NONAUTO W/O SCOPE Negative  - given 1 dose fluconazole     The patient indicates understanding of these issues and agrees to the plan. Return in about 3 months (around 3/11/2024).         Irina Mckoy MD  12/11/2023

## 2024-01-05 ENCOUNTER — APPOINTMENT (OUTPATIENT)
Dept: LAB | Facility: HOSPITAL | Age: 59
End: 2024-01-05
Attending: INTERNAL MEDICINE
Payer: MEDICARE

## 2024-01-05 PROCEDURE — 82274 ASSAY TEST FOR BLOOD FECAL: CPT

## 2024-01-10 LAB — HEMOCCULT STL QL: POSITIVE

## 2024-01-11 DIAGNOSIS — R19.5 POSITIVE FIT (FECAL IMMUNOCHEMICAL TEST): Primary | ICD-10-CM

## 2024-01-19 ENCOUNTER — TELEPHONE (OUTPATIENT)
Dept: INTERNAL MEDICINE CLINIC | Facility: CLINIC | Age: 59
End: 2024-01-19

## 2024-01-19 NOTE — TELEPHONE ENCOUNTER
Last 2 office visit sent to fax number provided below, mailed copies to home address as requested from pt.

## 2024-01-19 NOTE — TELEPHONE ENCOUNTER
Patient called and is requesting if last two office notes can be faxed over to her employer due to long term disability.  Please call when faxed and she would like a copy.   Claim Number:   [id number]    FAX: [phone]

## 2024-01-23 ENCOUNTER — NURSE TRIAGE (OUTPATIENT)
Dept: INTERNAL MEDICINE CLINIC | Facility: CLINIC | Age: 59
End: 2024-01-23

## 2024-01-23 NOTE — TELEPHONE ENCOUNTER
Action Requested: Summary for Provider     []  Critical Lab, Recommendations Needed  [] Need Additional Advice  [x]   FYI    []   Need Orders  [] Need Medications Sent to Pharmacy  []  Other     SUMMARY: We received call from [employer]  Kendal who is over Bailee's long term disability case. She received call from patient with statements about her family wanting to take her organs and that family is stealing money from her. Kendal from [employer] feels patient is in crisis state after conversation with patient. She is obligated to call us and inform us of this.     After over 20 minutes on the phone with  the patient, Patient was agreeable to talking with someone in behavioral health due to anger concern and mood in hopes that someone could help mange this better. I was able to perform soft transfer to [facility] via the 24/7 line [phone].     Reason for call: Psychiatric Problem  Onset: unknown    I called and spoke with the patient. She does not see carson currently, not on medications because of side effects that she spoke with Dr. Perkins about in December. Patient was having flight of thoughts discussing suicide of family members that she feels her sister and brother know about or were possibly the cause of others in her family committing suicide.  She was saying that her family is stealing money from her and feels they are taking money from her disability. She says that her family is going through her mail, family wants guardianship of her so they can get paid or take money from her. She did say that she reached out to [facility] because she wants to be sure she is getting paid her full benefit and she is asking to sit down with her to go through payments with her so she can understand. She then was talking about going in for colonoscopy and that she was being asked or told she needed to donate her liver to someone else. She also shared that her family is making fake bank  statements and balances are going down which again she states she feels that her family is stealing from her.     Thoughts during conversation were not clear, focused and she stated she was angry.     I was able to perform CSSR throughout course of conversation with which resulted in no risk. She did state that she would not do something to herself because she would want to \"drive everyone crazy\" than do something to herself.     Reason for Disposition   Patient sounds very upset or troubled to the triager    Protocols used: Anxiety and Panic Attack-A-OH

## 2024-02-20 ENCOUNTER — OFFICE VISIT (OUTPATIENT)
Dept: RHEUMATOLOGY | Facility: CLINIC | Age: 59
End: 2024-02-20

## 2024-02-20 ENCOUNTER — LAB ENCOUNTER (OUTPATIENT)
Dept: LAB | Facility: HOSPITAL | Age: 59
End: 2024-02-20
Attending: INTERNAL MEDICINE
Payer: MEDICARE

## 2024-02-20 VITALS
SYSTOLIC BLOOD PRESSURE: 134 MMHG | WEIGHT: 224 LBS | HEART RATE: 62 BPM | DIASTOLIC BLOOD PRESSURE: 81 MMHG | HEIGHT: 64 IN | BODY MASS INDEX: 38.24 KG/M2

## 2024-02-20 DIAGNOSIS — M25.50 POLYARTHRALGIA: ICD-10-CM

## 2024-02-20 DIAGNOSIS — M79.7 FIBROMYALGIA: Primary | ICD-10-CM

## 2024-02-20 LAB
CRP SERPL-MCNC: <0.4 MG/DL (ref ?–1)
ERYTHROCYTE [SEDIMENTATION RATE] IN BLOOD: 18 MM/HR
RHEUMATOID FACT SERPL-ACNC: <10 IU/ML (ref ?–14)

## 2024-02-20 PROCEDURE — 81374 HLA I TYPING 1 ANTIGEN LR: CPT

## 2024-02-20 PROCEDURE — 86038 ANTINUCLEAR ANTIBODIES: CPT | Performed by: INTERNAL MEDICINE

## 2024-02-20 PROCEDURE — 36415 COLL VENOUS BLD VENIPUNCTURE: CPT | Performed by: INTERNAL MEDICINE

## 2024-02-20 PROCEDURE — 86225 DNA ANTIBODY NATIVE: CPT | Performed by: INTERNAL MEDICINE

## 2024-02-20 PROCEDURE — 99204 OFFICE O/P NEW MOD 45 MIN: CPT | Performed by: INTERNAL MEDICINE

## 2024-02-20 PROCEDURE — 86200 CCP ANTIBODY: CPT | Performed by: INTERNAL MEDICINE

## 2024-02-20 PROCEDURE — 86039 ANTINUCLEAR ANTIBODIES (ANA): CPT | Performed by: INTERNAL MEDICINE

## 2024-02-20 PROCEDURE — 85652 RBC SED RATE AUTOMATED: CPT | Performed by: INTERNAL MEDICINE

## 2024-02-20 PROCEDURE — 86235 NUCLEAR ANTIGEN ANTIBODY: CPT | Performed by: INTERNAL MEDICINE

## 2024-02-20 PROCEDURE — 86140 C-REACTIVE PROTEIN: CPT | Performed by: INTERNAL MEDICINE

## 2024-02-20 PROCEDURE — 86431 RHEUMATOID FACTOR QUANT: CPT | Performed by: INTERNAL MEDICINE

## 2024-02-20 RX ORDER — DULOXETIN HYDROCHLORIDE 20 MG/1
20 CAPSULE, DELAYED RELEASE ORAL DAILY
Qty: 60 CAPSULE | Refills: 0 | Status: SHIPPED | OUTPATIENT
Start: 2024-02-20

## 2024-02-20 NOTE — PATIENT INSTRUCTIONS
You were seen today for diffuse joint and muscle pain  Lets get further blood work to make sure there is nothing autoimmune going on  Lets try Cymbalta 20 mg daily to see if that helps some of your joint and muscle pain  We can always increase the Cymbalta  Blood work today  Follow-up in the next 3 to 4 months

## 2024-02-20 NOTE — PROGRESS NOTES
Bailee Kilgore is a 58 year old female who presents for   Chief Complaint   Patient presents with    Consult     NP seen by  on 11/22/23 for Fibromyalgia     Knee Pain     Right knee pain and mild swelling     Foot Pain     Murray    .   HPI:   CC: joint and muscle pain   Consult: referred by PCP Dr. Iman Perkins    This is a 57 yo F with hx of IBS, Pre-DM, HLD, CAD s/p angioplasty in 2015, Fatty liver, Migraine's presents to establish care for Fibromyalgia.  She reports chronic joint and muscle pain diffusely.  When she wakes up in the morning she has significant pain in her feet, she states they swell.  It is hard to walk in the morning due to the pain in her feet.  Her legs give out.  She fell down the stairs yesterday as her legs gave out.  Her legs will also swell.  Last year she fell 3 times.  She feels electric shocks in her legs that have been for a few seconds and then her legs gave out.  She states that her right knee can swell.  She has sciatica on her right side intermittently.  At times she has to drag her right leg due to the pain.  She also has restless leg syndrome.  She reports a lot of pain in her shoulders, shoulder blade and trapezius region.  She has pain in her hands.  She was worked up for lupus in the past and was negative.  She states that lupus and MS monitor her family.  She reports a history of eczema.  She reports dry eyes and dry mouth.  Denies any history of serositis, DVT or PE, miscarriages, RP.  She reports memory issues, brain fog.  She was on Cymbalta and gabapentin in the past but had side effects.  She also tried Flexeril but did not help.  Her main issue is joint pain involving her right knee and significant pain in both feet.    Past medications:  Flexeril- caused drowsiness   Gabapentin- had a reaction  Lyrica- swelling and weight gain, it did help      Wt Readings from Last 2 Encounters:   02/20/24 224 lb (101.6 kg)   12/11/23 220 lb 12.8 oz (100.2 kg)      Body mass index is 38.45 kg/m².      Current Outpatient Medications   Medication Sig Dispense Refill    cholecalciferol (VITAMIN D3) 10 MCG (400 UNIT) Oral Tab Take 1 tablet (400 Units total) by mouth daily.      fluticasone propionate 50 MCG/ACT Nasal Suspension 1 spray by Nasal route daily.      aspirin 81 MG Oral Chew Tab Chew 1 tablet (81 mg total) by mouth daily.      rosuvastatin 10 MG Oral Tab Take 1 tablet (10 mg total) by mouth nightly. (Patient not taking: Reported on 2/20/2024) 90 tablet 1    metFORMIN  MG Oral Tablet 24 Hr Take 1 tablet (500 mg total) by mouth 2 (two) times daily with meals. (Patient not taking: Reported on 2/20/2024) 180 tablet 0    fluPHENAZine 5 MG Oral Tab Take 1 tablet (5 mg total) by mouth 2 (two) times daily. (Patient not taking: Reported on 2/20/2024)      benztropine 0.5 MG Oral Tab Take 1 tablet (0.5 mg total) by mouth every 4 (four) hours as needed. (Patient not taking: Reported on 2/20/2024)      cyclobenzaprine 5 MG Oral Tab Take 1 tablet (5 mg total) by mouth nightly as needed for Muscle spasms. (Patient not taking: Reported on 2/20/2024) 30 tablet 2      Past Medical History:   Diagnosis Date    Arrhythmia     Fibromyalgia     Irritable bowel syndrome     Migraines       Past Surgical History:   Procedure Laterality Date    BREAST SURGERY      TOTAL ABDOM HYSTERECTOMY        No family history on file.   Social History:  Social History     Socioeconomic History    Marital status: Single   Tobacco Use    Smoking status: Never    Smokeless tobacco: Never   Vaping Use    Vaping Use: Never used   Substance and Sexual Activity    Alcohol use: Yes     Comment: socially     Drug use: Never           REVIEW OF SYSTEMS:   Review Of Systems:  Constitutional: No fever, no change in weight or appetitie  Derm: No rashes, no oral ulcers, no alopecia, no photosensitivity, no psoriasis  HEENT: + dry eyes, + dry mouth, no Raynaud's, no nasal ulcers, no parotid swelling, no neck  pain, no jaw pain, no temple pain  Eyes: No visual changes,   CVS: No chest pain, no heart disease  RS: No SOB, no Cough, No Pleurtic pain,   GI: No nausea, no vomiiting, no abominal pain, no hx of ulcer, no gastritis, no heartburn, no dyshpagia, no BRBPR or melena  : no dysuria, no hx of miscarriages, no DVT Hx, no hx of OCP,   Neuro: No numbness or tingling, no headache, no hx of seizures,   Psych: no hx of anxiety or depression  ENDO: no hx of thyroid disease, no hx of DM  Joint/Muscluskeltal: see HPI,   All other ROS are negative.     EXAM:   /81 (BP Location: Left arm, Patient Position: Sitting, Cuff Size: adult)   Pulse 62   Ht 5' 4\" (1.626 m)   Wt 224 lb (101.6 kg)   BMI 38.45 kg/m²   GEN: AAOx3, NAD  HEENT: EOMI, PERRLA, no injection or icterus, oral mucosa moist, no oral lesions. No lymphadenopathy. No facial rash  CVS: RRR, no murmurs rubs or gallops. Equal 2+ distal pulses.   LUNGS: CTAB, no increased work of breathing  ABDOMEN:  soft NT/ND, +BS, no HSM  SKIN: No rashes or skin lesions. No nail findings  MSK:  Synovitis or swelling on exam  Full range of motion in the shoulders  TTP in the paraspinal region and trapezius region  NEURO: Cranial nerves II-XII intact grossly. 5/5 strength throughout in both upper and lower extremities, sensation intact.  PSYCH: normal mood    LABS:     Reviewed    IMAGING:     XR right knee 10/2023:  No acute osseous process.   Mild to moderate tricompartmental osteoarthritis.     ASSESSMENT AND PLAN:       Diffuse myofascial pain, polyarthralgias  - She reports significant muscle pain in her paraspinal region and trapezius region.  Also has a lot of joint pain involving her right knee and feet  - No evidence of synovitis or swelling on exam.  No history of psoriasis  - She was on gabapentin, Lyrica and Flexeril in the past but had side effects  - Plan to obtain blood work   - Advised patient that she could try Cymbalta low-dose 20 mg daily.  Risk/benefit d/w  patient     Thank you for allowing me to participate in this patients care. Pt will f/u in 3 mos     Tory Simmons MD  2/20/2024  2:07 PM

## 2024-02-21 LAB — CCP IGG SERPL-ACNC: 1.3 U/ML (ref 0–6.9)

## 2024-02-22 LAB — NUCLEAR IGG TITR SER IF: POSITIVE {TITER}

## 2024-02-23 LAB
ANA NUCLEOLAR TITR SER IF: 80 {TITER}
DSDNA AB TITR SER: <10 {TITER}

## 2024-02-24 LAB
CENTROMERE IGG SER-ACNC: <0.4 U/ML
ENA JO1 AB SER IA-ACNC: <0.3 U/ML
ENA RNP IGG SER IA-ACNC: 1.1 U/ML
ENA SCL70 IGG SER IA-ACNC: <0.6 U/ML
ENA SM IGG SER IA-ACNC: <0.7 U/ML
ENA SS-A IGG SER IA-ACNC: <0.4 U/ML
ENA SS-B IGG SER IA-ACNC: <0.4 U/ML
U1 SNRNP IGG SER IA-ACNC: 1 U/ML

## 2024-02-26 LAB — HLA-B27: NEGATIVE

## 2024-02-27 ENCOUNTER — APPOINTMENT (OUTPATIENT)
Dept: GENERAL RADIOLOGY | Facility: HOSPITAL | Age: 59
End: 2024-02-27
Attending: EMERGENCY MEDICINE
Payer: MEDICARE

## 2024-02-27 ENCOUNTER — HOSPITAL ENCOUNTER (EMERGENCY)
Facility: HOSPITAL | Age: 59
Discharge: HOME | End: 2024-02-27
Attending: EMERGENCY MEDICINE
Payer: MEDICARE

## 2024-02-27 ENCOUNTER — APPOINTMENT (OUTPATIENT)
Dept: CT IMAGING | Facility: HOSPITAL | Age: 59
End: 2024-02-27
Attending: EMERGENCY MEDICINE
Payer: MEDICARE

## 2024-02-27 ENCOUNTER — APPOINTMENT (OUTPATIENT)
Dept: MRI IMAGING | Facility: HOSPITAL | Age: 59
End: 2024-02-27
Attending: EMERGENCY MEDICINE
Payer: MEDICARE

## 2024-02-27 ENCOUNTER — NURSE TRIAGE (OUTPATIENT)
Dept: INTERNAL MEDICINE CLINIC | Facility: CLINIC | Age: 59
End: 2024-02-27

## 2024-02-27 VITALS
SYSTOLIC BLOOD PRESSURE: 123 MMHG | RESPIRATION RATE: 20 BRPM | HEART RATE: 51 BPM | TEMPERATURE: 98 F | DIASTOLIC BLOOD PRESSURE: 73 MMHG | OXYGEN SATURATION: 100 % | BODY MASS INDEX: 38.24 KG/M2 | WEIGHT: 224 LBS | HEIGHT: 64 IN

## 2024-02-27 DIAGNOSIS — R20.2 PARESTHESIAS: ICD-10-CM

## 2024-02-27 DIAGNOSIS — R07.9 CHEST PAIN OF UNCERTAIN ETIOLOGY: Primary | ICD-10-CM

## 2024-02-27 LAB
ALBUMIN SERPL-MCNC: 3.8 G/DL (ref 3.2–4.8)
ALP LIVER SERPL-CCNC: 101 U/L
ALT SERPL-CCNC: 8 U/L
ANION GAP SERPL CALC-SCNC: 8 MMOL/L (ref 0–18)
AST SERPL-CCNC: 23 U/L (ref ?–34)
ATRIAL RATE: 51 BPM
BASOPHILS # BLD AUTO: 0.05 X10(3) UL (ref 0–0.2)
BASOPHILS NFR BLD AUTO: 0.8 %
BILIRUB DIRECT SERPL-MCNC: 0.1 MG/DL (ref ?–0.3)
BILIRUB SERPL-MCNC: 0.6 MG/DL (ref 0.2–1.1)
BILIRUB UR QL: NEGATIVE
BUN BLD-MCNC: 8 MG/DL (ref 9–23)
BUN/CREAT SERPL: 8.5 (ref 10–20)
CALCIUM BLD-MCNC: 8.7 MG/DL (ref 8.7–10.4)
CHLORIDE SERPL-SCNC: 112 MMOL/L (ref 98–112)
CLARITY UR: CLEAR
CO2 SERPL-SCNC: 23 MMOL/L (ref 21–32)
CREAT BLD-MCNC: 0.94 MG/DL
DEPRECATED RDW RBC AUTO: 42.6 FL (ref 35.1–46.3)
EGFRCR SERPLBLD CKD-EPI 2021: 69 ML/MIN/1.73M2 (ref 60–?)
EOSINOPHIL # BLD AUTO: 0.1 X10(3) UL (ref 0–0.7)
EOSINOPHIL NFR BLD AUTO: 1.6 %
ERYTHROCYTE [DISTWIDTH] IN BLOOD BY AUTOMATED COUNT: 13.8 % (ref 11–15)
GLUCOSE BLD-MCNC: 110 MG/DL (ref 70–99)
GLUCOSE UR-MCNC: NORMAL MG/DL
HCT VFR BLD AUTO: 41.7 %
HGB BLD-MCNC: 13.7 G/DL
HGB UR QL STRIP.AUTO: NEGATIVE
IMM GRANULOCYTES # BLD AUTO: 0.03 X10(3) UL (ref 0–1)
IMM GRANULOCYTES NFR BLD: 0.5 %
KETONES UR-MCNC: NEGATIVE MG/DL
LEUKOCYTE ESTERASE UR QL STRIP.AUTO: NEGATIVE
LIPASE SERPL-CCNC: 40 U/L (ref 13–75)
LYMPHOCYTES # BLD AUTO: 2.38 X10(3) UL (ref 1–4)
LYMPHOCYTES NFR BLD AUTO: 37.5 %
MCH RBC QN AUTO: 27.7 PG (ref 26–34)
MCHC RBC AUTO-ENTMCNC: 32.9 G/DL (ref 31–37)
MCV RBC AUTO: 84.4 FL
MONOCYTES # BLD AUTO: 0.3 X10(3) UL (ref 0.1–1)
MONOCYTES NFR BLD AUTO: 4.7 %
NEUTROPHILS # BLD AUTO: 3.49 X10 (3) UL (ref 1.5–7.7)
NEUTROPHILS # BLD AUTO: 3.49 X10(3) UL (ref 1.5–7.7)
NEUTROPHILS NFR BLD AUTO: 54.9 %
NITRITE UR QL STRIP.AUTO: NEGATIVE
OSMOLALITY SERPL CALC.SUM OF ELEC: 295 MOSM/KG (ref 275–295)
P AXIS: 51 DEGREES
P-R INTERVAL: 162 MS
PH UR: 8.5 [PH] (ref 5–8)
PLATELET # BLD AUTO: 246 10(3)UL (ref 150–450)
POTASSIUM SERPL-SCNC: 3.5 MMOL/L (ref 3.5–5.1)
PROT SERPL-MCNC: 6.7 G/DL (ref 5.7–8.2)
PROT UR-MCNC: 20 MG/DL
Q-T INTERVAL: 464 MS
QRS DURATION: 80 MS
QTC CALCULATION (BEZET): 427 MS
R AXIS: 8 DEGREES
RBC # BLD AUTO: 4.94 X10(6)UL
SODIUM SERPL-SCNC: 143 MMOL/L (ref 136–145)
SP GR UR STRIP: 1.02 (ref 1–1.03)
T AXIS: 19 DEGREES
TROPONIN I SERPL HS-MCNC: <3 NG/L
UROBILINOGEN UR STRIP-ACNC: NORMAL
VENTRICULAR RATE: 51 BPM
WBC # BLD AUTO: 6.4 X10(3) UL (ref 4–11)

## 2024-02-27 PROCEDURE — 93010 ELECTROCARDIOGRAM REPORT: CPT

## 2024-02-27 PROCEDURE — 93005 ELECTROCARDIOGRAM TRACING: CPT

## 2024-02-27 PROCEDURE — 96374 THER/PROPH/DIAG INJ IV PUSH: CPT

## 2024-02-27 PROCEDURE — 80048 BASIC METABOLIC PNL TOTAL CA: CPT | Performed by: EMERGENCY MEDICINE

## 2024-02-27 PROCEDURE — 99285 EMERGENCY DEPT VISIT HI MDM: CPT

## 2024-02-27 PROCEDURE — 80076 HEPATIC FUNCTION PANEL: CPT | Performed by: EMERGENCY MEDICINE

## 2024-02-27 PROCEDURE — 81001 URINALYSIS AUTO W/SCOPE: CPT | Performed by: EMERGENCY MEDICINE

## 2024-02-27 PROCEDURE — 85025 COMPLETE CBC W/AUTO DIFF WBC: CPT | Performed by: EMERGENCY MEDICINE

## 2024-02-27 PROCEDURE — C9113 INJ PANTOPRAZOLE SODIUM, VIA: HCPCS | Performed by: EMERGENCY MEDICINE

## 2024-02-27 PROCEDURE — 71045 X-RAY EXAM CHEST 1 VIEW: CPT | Performed by: EMERGENCY MEDICINE

## 2024-02-27 PROCEDURE — 70551 MRI BRAIN STEM W/O DYE: CPT | Performed by: EMERGENCY MEDICINE

## 2024-02-27 PROCEDURE — 84484 ASSAY OF TROPONIN QUANT: CPT | Performed by: EMERGENCY MEDICINE

## 2024-02-27 PROCEDURE — 71260 CT THORAX DX C+: CPT | Performed by: EMERGENCY MEDICINE

## 2024-02-27 PROCEDURE — 83690 ASSAY OF LIPASE: CPT | Performed by: EMERGENCY MEDICINE

## 2024-02-27 RX ORDER — OMEPRAZOLE 40 MG/1
40 CAPSULE, DELAYED RELEASE ORAL DAILY
Qty: 30 CAPSULE | Refills: 0 | Status: SHIPPED | OUTPATIENT
Start: 2024-02-27 | End: 2024-03-28

## 2024-02-27 RX ORDER — SUCRALFATE ORAL 1 G/10ML
1 SUSPENSION ORAL
Qty: 560 ML | Refills: 0 | Status: SHIPPED | OUTPATIENT
Start: 2024-02-27 | End: 2024-03-12

## 2024-02-27 NOTE — TELEPHONE ENCOUNTER
Action Requested: Summary for Provider     []  Critical Lab, Recommendations Needed  [] Need Additional Advice  [x]   FYI    []   Need Orders  [] Need Medications Sent to Pharmacy  []  Other     SUMMARY: ED advised, RN offered to call 911 but declined, she prefers to call her daughter directly and they will go to ED .       Reason for call: Back Pain  Onset: today       Severe upper back pain, pain level 8/10, just woke up,with  shortness of breath, took 2 ASA , has boil vagina, hard to walk and get up,worse on activity, lives alone, no injury, no fever .          Reason for Disposition   SEVERE back pain of sudden onset and age > 60 years    Protocols used: Back Pain-A-[place]

## 2024-02-27 NOTE — ED PROVIDER NOTES
Patient Seen in: [facility] Emergency Department      History     Chief Complaint   Patient presents with    Chest Pain Angina     Stated Complaint: Chest Pain, SOB, Left shoulder pain, back pain    Subjective:   HPI    Patient presents the emergency department complaining of chest pain shortness of breath left shoulder pain upper back pain and tingling in her left arm and leg.  She states that last night she developed abdominal pain after eating and was concerned she might of had food poisoning or gastritis.  She states that then at 2 AM she developed chest pain which has persisted.  The pain is worse with deep breathing.  There is no fever.  There is no vomiting.  She is describes shortness of breath.  She also states that she has had some tingling in her left arm and left leg associated with the pain as well.    Objective:   Past Medical History:   Diagnosis Date    Arrhythmia     Fibromyalgia     Irritable bowel syndrome     Migraines               Past Surgical History:   Procedure Laterality Date    BREAST SURGERY      TOTAL ABDOM HYSTERECTOMY                  Social History     Socioeconomic History    Marital status: Single   Tobacco Use    Smoking status: Never    Smokeless tobacco: Never   Vaping Use    Vaping Use: Never used   Substance and Sexual Activity    Alcohol use: Not Currently     Comment: socially     Drug use: Never              Review of Systems    Positive for stated complaint: Chest Pain, SOB, Left shoulder pain, back pain  Other systems are as noted in HPI.  Constitutional and vital signs reviewed.      All other systems reviewed and negative except as noted above.    Physical Exam     ED Triage Vitals [02/27/24 1436]   /79   Pulse (!) 49   Resp 22   Temp 97.6 °F (36.4 °C)   Temp src Temporal   SpO2 99 %   O2 Device None (Room air)       Current:/73   Pulse 51   Temp 98.2 °F (36.8 °C) (Oral)   Resp 20   Ht 162.6 cm (5' 4\")   Wt 101.6 kg   SpO2 100%   BMI 38.45  kg/m²       Physical Exam  Vitals and nursing note reviewed.   Constitutional:       General: She is not in acute distress.     Appearance: She is well-developed.   HENT:      Head: Normocephalic.      Nose: Nose normal.      Mouth/Throat:      Mouth: Mucous membranes are moist.   Eyes:      Conjunctiva/sclera: Conjunctivae normal.   Cardiovascular:      Rate and Rhythm: Normal rate and regular rhythm.      Heart sounds: No murmur heard.  Pulmonary:      Effort: Pulmonary effort is normal. No respiratory distress.      Breath sounds: Normal breath sounds.   Abdominal:      General: There is no distension.      Palpations: Abdomen is soft.      Tenderness: There is no abdominal tenderness.   Musculoskeletal:         General: No tenderness. Normal range of motion.      Cervical back: Normal range of motion and neck supple.   Skin:     General: Skin is warm and dry.      Capillary Refill: Capillary refill takes less than 2 seconds.      Findings: No rash.   Neurological:      General: No focal deficit present.      Mental Status: She is alert and oriented to person, place, and time.               ED Course     Labs Reviewed   BASIC METABOLIC PANEL (8) - Abnormal; Notable for the following components:       Result Value    Glucose 110 (*)     BUN 8 (*)     BUN/CREA Ratio 8.5 (*)     All other components within normal limits   URINALYSIS, ROUTINE - Abnormal; Notable for the following components:    pH Urine 8.5 (*)     Protein Urine 20 (*)     All other components within normal limits   HEPATIC FUNCTION PANEL (7) - Abnormal; Notable for the following components:    ALT 8 (*)     All other components within normal limits   TROPONIN I HIGH SENSITIVITY - Normal   LIPASE - Normal   CBC WITH DIFFERENTIAL WITH PLATELET    Narrative:     The following orders were created for panel order CBC With Differential With Platelet.  Procedure                               Abnormality         Status                     ---------                                -----------         ------                     CBC W/ DIFFERENTIAL[[id number]]                              Final result                 Please view results for these tests on the individual orders.   CBC W/ DIFFERENTIAL     EKG    Rate, intervals and axes as noted on EKG Report.  Rate: 51 bpm  Rhythm: Sinus Rhythm  Reading: Normal EKG other than bradycardia.  Unchanged from old EKG                          MDM            Heart Score:    HEART Score      Title      Criteria Score   Age: 45-64 Age Score: 1   History: Slightly Suspicious Hx Score: 0     EKG: Non-Spec Changes EKG Score: 1   HTN: No   Hypercholesterolemia: Yes   Atherosclerosis/PVD: No     DM: No   BMI>30kg/m2: Yes   Smoking: No   Family History: No         Other Risk Factor Score: 2             Lab Results   Component Value Date    TROP <0.045 09/09/2020    TROPHS <3 02/27/2024           HEART Score: 3        Risk of adverse cardiac event is 0.9-1.7%                      Medical Decision Making  Differential diagnosis considered for PE, esophagitis, angina, non-STEMI.    Problems Addressed:  Chest pain of uncertain etiology: acute illness or injury  Paresthesias: acute illness or injury    Amount and/or Complexity of Data Reviewed  External Data Reviewed: labs, radiology, ECG and notes.     Details: Labs, imaging, EKG and notes all reviewed from August 2023.  At [facility] she was evaluated for almost identical symptoms with a stress test which was normal and MRIs were normal.  Labs: ordered. Decision-making details documented in ED Course.     Details: Troponin, CBC, chemistry all normal.  Radiology: ordered and independent interpretation performed. Decision-making details documented in ED Course.     Details: Chest x-ray normal.  CT scan of the chest shows no evidence of PE.  MRI shows no evidence of stroke.  ECG/medicine tests: ordered and independent interpretation performed. Decision-making details documented in ED  Course.  Discussion of management or test interpretation with external provider(s): Patient states that she has not had ongoing chest pain for 18 hours.  Pain is also pleuritic and worse with deep breathing.  With atypical of symptoms and a heart score of 3, patient can be discharged home to follow-up with her primary care physician.  She will be treated for possible gastritis and esophagitis as all of this began with epigastric discomfort last night after eating and I suspect this may have exacerbated esophageal inflammation.  She will be discharged on Prilosec. Of note, pt persistently bradycardic from 44-65 during entire stay. Review of numerous old EKGs shows long standing sinus bradycardia.    Risk  Prescription drug management.        Disposition and Plan     Clinical Impression:  1. Chest pain of uncertain etiology    2. Paresthesias         Disposition:  Discharge  2/27/2024  7:17 pm    Follow-up:  Iman Perkins MD  [address] [place] [id number]  [phone]    Schedule an appointment as soon as possible for a visit in 2 day(s)      We recommend that you schedule follow up care with a primary care provider within the next three months to obtain basic health screening including reassessment of your blood pressure.      Medications Prescribed:  Discharge Medication List as of 2/27/2024  7:22 PM        START taking these medications    Details   Omeprazole 40 MG Oral Capsule Delayed Release Take 1 capsule (40 mg total) by mouth daily., Normal, Disp-30 capsule, R-0      sucralfate 1 GM/10ML Oral Suspension Take 10 mL (1 g total) by mouth 4 (four) times daily before meals and nightly for 14 days., Print, Disp-560 mL, R-0

## 2024-02-27 NOTE — ED INITIAL ASSESSMENT (HPI)
Patient arrives with CP, SOB, back pain, and L shoulder pain starting this AM. Patient reports abdominal pain and bilateral leg tingling starting yesterday. Patient reports pain with inspiration.     Patient also mentioned that she does not feel safe at home or anywhere.

## 2024-02-28 NOTE — ED QUICK NOTES
PATIENT WAS ASKED WHETHER SHE DOESN'T FEEL SAFE AT HOME- SHE SAID\"I DON'T WANT TO TALK ABOUT IT.\"

## 2024-02-28 NOTE — DISCHARGE INSTRUCTIONS
Your chest today showed no evidence of a blood clot in your lung, stroke or evidence of heart damage.  We recommend you follow-up with your primary care physician in the next 48 hours and return for worsening condition.

## 2024-02-29 ENCOUNTER — OFFICE VISIT (OUTPATIENT)
Dept: INTERNAL MEDICINE CLINIC | Facility: CLINIC | Age: 59
End: 2024-02-29

## 2024-02-29 ENCOUNTER — LAB ENCOUNTER (OUTPATIENT)
Dept: LAB | Age: 59
End: 2024-02-29
Attending: INTERNAL MEDICINE
Payer: MEDICARE

## 2024-02-29 VITALS
HEIGHT: 64 IN | OXYGEN SATURATION: 99 % | DIASTOLIC BLOOD PRESSURE: 83 MMHG | BODY MASS INDEX: 38.89 KG/M2 | WEIGHT: 227.81 LBS | SYSTOLIC BLOOD PRESSURE: 131 MMHG | HEART RATE: 56 BPM

## 2024-02-29 DIAGNOSIS — Z11.3 SCREENING EXAMINATION FOR STD (SEXUALLY TRANSMITTED DISEASE): Primary | ICD-10-CM

## 2024-02-29 DIAGNOSIS — Z11.3 SCREENING EXAMINATION FOR STD (SEXUALLY TRANSMITTED DISEASE): ICD-10-CM

## 2024-02-29 DIAGNOSIS — R10.13 EPIGASTRIC PAIN: ICD-10-CM

## 2024-02-29 DIAGNOSIS — R10.13 DYSPEPSIA: ICD-10-CM

## 2024-02-29 LAB
HBV SURFACE AG SER-ACNC: 0.24 [IU]/L
HBV SURFACE AG SERPL QL IA: NONREACTIVE
HCV AB SERPL QL IA: NONREACTIVE
T PALLIDUM AB SER QL IA: NONREACTIVE

## 2024-02-29 PROCEDURE — 87591 N.GONORRHOEAE DNA AMP PROB: CPT

## 2024-02-29 PROCEDURE — 86803 HEPATITIS C AB TEST: CPT

## 2024-02-29 PROCEDURE — 36415 COLL VENOUS BLD VENIPUNCTURE: CPT

## 2024-02-29 PROCEDURE — 87389 HIV-1 AG W/HIV-1&-2 AB AG IA: CPT

## 2024-02-29 PROCEDURE — 86780 TREPONEMA PALLIDUM: CPT

## 2024-02-29 PROCEDURE — 83013 H PYLORI (C-13) BREATH: CPT

## 2024-02-29 PROCEDURE — 87340 HEPATITIS B SURFACE AG IA: CPT

## 2024-02-29 PROCEDURE — 99214 OFFICE O/P EST MOD 30 MIN: CPT | Performed by: INTERNAL MEDICINE

## 2024-02-29 PROCEDURE — 87491 CHLMYD TRACH DNA AMP PROBE: CPT

## 2024-02-29 RX ORDER — FLUCONAZOLE 150 MG/1
150 TABLET ORAL
COMMUNITY
Start: 2023-12-11

## 2024-02-29 NOTE — TELEPHONE ENCOUNTER
Patient has an appointment today at 120 pm .       Clinical Impressions      Chest pain of uncertain etiology    Paresthesias  Disposition      Discharge      ED/IC AVS (Printed 2/27/2024)      Follow-Ups: Schedule an appointment with Iman Perkins MD (Internal Medicine) in 2 days (2/29/2024)    Future Appointments   Date Time Provider Department Center   2/29/2024  1:20 PM Iman Perkins MD [facility]   6/24/2024  2:40 PM Tory Simmons MD [facility]

## 2024-02-29 NOTE — PROGRESS NOTES
Bailee Kilgore is a 58 year old female who is here for  1. Screening examination for STD (sexually transmitted disease)    2. Epigastric pain    3. Dyspepsia      HPI:   Bailee Kilgore is a 58 year old female  presents for ER follow up .  Went to ER for chest and back pain, she did eat out and felt stomach pain, tried sprite and aspirin and she felt worse. She was prescribed omeprazole and sucralfate but she did not get it because she said she was told she does not have prescription plan.     Past Medical History:   Diagnosis Date    Arrhythmia     Fibromyalgia     Irritable bowel syndrome     Migraines      Past Surgical History:   Procedure Laterality Date    BREAST SURGERY      TOTAL ABDOM HYSTERECTOMY         Current Outpatient Medications:     Omeprazole 40 MG Oral Capsule Delayed Release, Take 1 capsule (40 mg total) by mouth daily., Disp: 30 capsule, Rfl: 0    sucralfate 1 GM/10ML Oral Suspension, Take 10 mL (1 g total) by mouth 4 (four) times daily before meals and nightly for 14 days., Disp: 560 mL, Rfl: 0    DULoxetine 20 MG Oral Cap DR Particles, Take 1 capsule (20 mg total) by mouth daily., Disp: 60 capsule, Rfl: 0    cholecalciferol (VITAMIN D3) 10 MCG (400 UNIT) Oral Tab, Take 1 tablet (400 Units total) by mouth daily., Disp: , Rfl:     aspirin 81 MG Oral Chew Tab, Chew 1 tablet (81 mg total) by mouth daily., Disp: , Rfl:     fluconazole 150 MG Oral Tab, Take 1 tablet (150 mg total) by mouth. (Patient not taking: Reported on 2/29/2024), Disp: , Rfl:     rosuvastatin 10 MG Oral Tab, Take 1 tablet (10 mg total) by mouth nightly. (Patient not taking: Reported on 2/20/2024), Disp: 90 tablet, Rfl: 1    metFORMIN  MG Oral Tablet 24 Hr, Take 1 tablet (500 mg total) by mouth 2 (two) times daily with meals. (Patient not taking: Reported on 2/20/2024), Disp: 180 tablet, Rfl: 0    fluPHENAZine 5 MG Oral Tab, Take 1 tablet (5 mg total) by mouth 2 (two) times daily. (Patient not taking:  Reported on 2/20/2024), Disp: , Rfl:     benztropine 0.5 MG Oral Tab, Take 1 tablet (0.5 mg total) by mouth every 4 (four) hours as needed. (Patient not taking: Reported on 2/20/2024), Disp: , Rfl:     cyclobenzaprine 5 MG Oral Tab, Take 1 tablet (5 mg total) by mouth nightly as needed for Muscle spasms. (Patient not taking: Reported on 2/20/2024), Disp: 30 tablet, Rfl: 2    fluticasone propionate 50 MCG/ACT Nasal Suspension, 1 spray by Nasal route daily. (Patient not taking: Reported on 2/29/2024), Disp: , Rfl:     Allergies:  Allergies   Allergen Reactions    Haloperidol NAUSEA AND VOMITING    Ziprasidone ITCHING    Zolmitriptan NAUSEA ONLY     Social History     Socioeconomic History    Marital status: Single     Spouse name: Not on file    Number of children: Not on file    Years of education: Not on file    Highest education level: Not on file   Occupational History    Not on file   Tobacco Use    Smoking status: Never    Smokeless tobacco: Never   Vaping Use    Vaping Use: Never used   Substance and Sexual Activity    Alcohol use: Not Currently     Comment: socially     Drug use: Never    Sexual activity: Not on file   Other Topics Concern    Not on file   Social History Narrative    Not on file     Social Determinants of Health     Financial Resource Strain: Not on file   Food Insecurity: Not on file   Transportation Needs: Not on file   Physical Activity: Not on file   Stress: Not on file   Social Connections: Not on file   Housing Stability: Not on file       REVIEW OF SYSTEMS:     GENERAL HEALTH: No fevers, chills, sweats, fatigue  VISION: No recent vision problems, blurry vision or double vision  HEENT: No decreased hearing ear pain nasal congestion or sore throat  SKIN: denies any unusual skin lesions or rashes  RESPIRATORY: denies shortness of breath, cough, wheezing  CARDIOVASCULAR: denies chest pain on exertion, palpitations, swelling in feet  GI: +abdominal pain and heartburn, nausea   : No Pain on  urination, change in the color of urine, discharge, urinating frequently  MUS: No back pain, joint pain, muscle pain  NEURO: denies headaches , anxiety, depression    EXAM:     Vitals:    02/29/24 1305   BP: 131/83   Pulse: 56   SpO2: 99%   Weight: 227 lb 12.8 oz (103.3 kg)   Height: 5' 4\" (1.626 m)     GENERAL: well developed, well nourished,in no apparent distress.  SKIN: no rashes,no suspicious lesions  HEENT: atraumatic, normocephalic,ears and throat are clear,   NECK: supple,no adenopathy,  LUNGS: clear to auscultation, no wheeze  CARDIO: RRR without murmur  GI: good BS's,no masses or tenderness  EXTREMITIES: no cyanosis, or edema    ASSESSMENT AND PLAN:   1. Epigastric pain  2. Dyspepsia  -dyspepsia and epigastric pain better with food  -was in the ER for chest and epigastric pain, extensive workup with EKG, CXR, CTA and MRI brain (for left side tingling) negative, blood work unremarkable  -stress test 8/2023 negative  Plan:  - Helicobacter Pylori Breath Test, Adult; Future  -Referral to GI for possible EGD    3. Screening examination for STD (sexually transmitted disease)  - Hepatitis B Surface Antigen; Future  - HIV AG AB Combo; Future  - T PALLIDUM SCREENING CASCADE; Future  - Chlamydia/Gc Amplification; Future      The patient indicates understanding of these issues and agrees to the plan.    Return if symptoms worsen or fail to improve.        Iman Perkins MD  2/29/2024

## 2024-03-01 LAB
C TRACH DNA SPEC QL NAA+PROBE: NEGATIVE
N GONORRHOEA DNA SPEC QL NAA+PROBE: NEGATIVE

## 2024-03-03 LAB — H PYLORI BREATH TEST: NEGATIVE

## 2024-03-06 ENCOUNTER — TELEPHONE (OUTPATIENT)
Dept: INTERNAL MEDICINE CLINIC | Facility: CLINIC | Age: 59
End: 2024-03-06

## 2024-03-06 DIAGNOSIS — R10.9 ABDOMINAL PAIN, UNSPECIFIED ABDOMINAL LOCATION: Primary | ICD-10-CM

## 2024-03-06 NOTE — TELEPHONE ENCOUNTER
The patient called and asking for lab results.   She wants to to know what is causing the pains.   She stated she wakes up with the abdominal pains and nausea.  Her whole left side goes numb.  She has gone to the ED with the pains and was seen on 2/29/24 for follow up.    The patient is asking what can be causing the abdominal pains?  She is asking if the blood in her stool can cause them.   I stated it can and she should follow up with GI as instructed on 1/5/23    I offered to transfer the call to schedule the colonoscopy but stated will call back.    Please advise.            Iman Perkins MD  1/11/2024 11:46 AM CST       Please inform patient with her abnormal test results. Positive blood in stool screening. So she will have to do a full colonoscopy. Referral placed for her to see GI.     Thank you.           Viola Morocho ,     I reviewed your test results. It looks normal.     Please let me know if you have any questions,  Have a nice day.     Iman Perkins MD   Written by Iman Prekins MD on 3/4/2024 12:49 PM CST    Iman Perkins MD  3/4/2024 12:49 PM CST       Please inform Bailee with her Normal results.     Thank you.

## 2024-03-08 ENCOUNTER — TELEPHONE (OUTPATIENT)
Dept: RHEUMATOLOGY | Facility: CLINIC | Age: 59
End: 2024-03-08

## 2024-03-08 NOTE — TELEPHONE ENCOUNTER
Name and  verified. Pt advised of results and recommendations. Pt verbalized with understanding.

## 2024-03-08 NOTE — TELEPHONE ENCOUNTER
If you can let patient know I reviewed her blood work.  It showing a positive JANAY 1:80 but further blood work for lupus, Sjogren's was negative.  Her inflammation markers were normal.  Blood work for rheumatoid arthritis was negative.  Will see how she does on the low-dose Cymbalta

## 2024-03-08 NOTE — TELEPHONE ENCOUNTER
Spoke to the patient and informed her of Provider message below. Phone number given for Central Scheduling # [phone] and Gastro department

## 2024-03-14 ENCOUNTER — NURSE TRIAGE (OUTPATIENT)
Dept: INTERNAL MEDICINE CLINIC | Facility: CLINIC | Age: 59
End: 2024-03-14

## 2024-03-14 NOTE — TELEPHONE ENCOUNTER
Action Requested: Summary for Provider     []  Critical Lab, Recommendations Needed  [] Need Additional Advice  [x]   FYI    []   Need Orders  [] Need Medications Sent to Pharmacy  []  Other     SUMMARY: Patient stated that she has been having on and off sores in her mouth and inside of her nose. Sores usually go away before she gets a chance to be evaluated by someone. Sores started up again yesterday. Had a headache and the back of her neck was hurting. Today patient has a runny nose. No other symptoms. Wanted to see Dr Perkins. Doctor just finishing up her day and does not have any appointments available and not in tomorrow. Offered other providers but declined. Stated that she is near [place]. No appointments in [place] but advised patient that we have our urgent care in [place] if she wanted to get evaluated today. Patient verbalized understanding and will go to the urgent care in [place] for an evaluation. Address provided.   Reason for call: Canker Sores (Sores in mouth and inside nose)  Onset: Mar 13, 2024  Reason for Disposition   Patient wants to be seen    Protocols used: Mouth Symptoms-A-[place]

## 2024-03-15 NOTE — TELEPHONE ENCOUNTER
Called patient to follow up. Was not able to go for an evaluation since could not get a ride. Patient stated that has an ultrasound in [place] on Monday at 9:45am. Appointment made for 3/18/2024 at 11am with Dr Reece in [place].

## 2024-03-18 ENCOUNTER — HOSPITAL ENCOUNTER (OUTPATIENT)
Age: 59
Discharge: HOME | End: 2024-03-18
Payer: MEDICARE

## 2024-03-18 VITALS
TEMPERATURE: 98 F | HEART RATE: 47 BPM | OXYGEN SATURATION: 99 % | DIASTOLIC BLOOD PRESSURE: 43 MMHG | SYSTOLIC BLOOD PRESSURE: 126 MMHG | RESPIRATION RATE: 18 BRPM

## 2024-03-18 DIAGNOSIS — R68.83 CHILLS: ICD-10-CM

## 2024-03-18 DIAGNOSIS — J34.89 SORE IN NOSE: Primary | ICD-10-CM

## 2024-03-18 LAB
POCT INFLUENZA A: NEGATIVE
POCT INFLUENZA B: NEGATIVE
SARS-COV-2 RNA RESP QL NAA+PROBE: NOT DETECTED

## 2024-03-18 NOTE — ED PROVIDER NOTES
Patient Seen in: [facility]      History     Chief Complaint   Patient presents with    Body ache and/or chills    Nose Problem     Stated Complaint: sore in nose/ covid testing    Subjective:   HPI    Patient is a 58-year-old female with past medical history of schizophrenic disorder, paranoia, fibromyalgia that presents to immediate care due to nose problem.  Patient states over the past 2 days she developed a sore in her left nostril.  Denies acute injury or trauma.  Denies epistaxis, recent sinus congestion fever.  States that the symptoms occur intermittently over the past few years but has been unable to follow-up with a doctor when symptoms occur.  Denies acute medication changes, recent environmental exposures.    Objective:   Past Medical History:   Diagnosis Date    Arrhythmia     Fibromyalgia     Irritable bowel syndrome     Migraines               Past Surgical History:   Procedure Laterality Date    BREAST SURGERY      TOTAL ABDOM HYSTERECTOMY                  Social History     Socioeconomic History    Marital status: Single   Tobacco Use    Smoking status: Never    Smokeless tobacco: Never   Vaping Use    Vaping Use: Never used   Substance and Sexual Activity    Alcohol use: Not Currently     Comment: socially     Drug use: Never              Review of Systems    Positive for stated complaint: sore in nose/ covid testing  Other systems are as noted in HPI.  Constitutional and vital signs reviewed.      All other systems reviewed and negative except as noted above.    Physical Exam     ED Triage Vitals [03/18/24 1226]   /43   Pulse (!) 47   Resp 18   Temp 98 °F (36.7 °C)   Temp src Oral   SpO2 99 %   O2 Device None (Room air)       Current:/43   Pulse (!) 47   Temp 98 °F (36.7 °C) (Oral)   Resp 18   SpO2 99%         Physical Exam    Vital signs reviewed. Nursing note reviewed.  Constitutional: Well-developed. Well-nourished. In no acute distress  HENT: Mucous membranes  moist.  1 cm circular ulcerative lesion in left internal nare.  No bleeding discharge.  TMs intact bilaterally. No trismus. Uvula midline. Mild posterior pharynx erythema.  No petechiae, exudates, or posterior pharynx edema.  EYES: No scleral icterus or conjunctival injection.  NECK: Full ROM. Supple.   CARDIAC: Normal rate. Normal S1/ S2. 2+ distal pulses. No edema  PULM/CHEST: Clear to auscultation bilaterally. No wheezes  Extremities: Full ROM  NEURO: Awake, alert, following commands, moving extremities, answering questions.   SKIN: Warm and dry. No rash or lesions.  PSYCH: Normal judgment. Normal affect.        ED Course     Labs Reviewed   POCT FLU TEST - Normal    Narrative:     This assay is a rapid molecular in vitro test utilizing nucleic acid amplification of influenza A and B viral RNA.   RAPID SARS-COV-2 BY PCR - Normal                      MDM      Patient is a 58-year-old female with past medical history of fibromyalgia, schizoaffective disorder, paranoia delusion that presents to immediate care due to sore in her left nare over the past 2 days.  Patient arrives with stable vitals.  Physical exam showing small ulcerative lesion on the lateral aspect of the left inner nare.  Most likely nasal sore, impetigo.  Encourage patient to apply bacitracin or Neosporin cream as needed.  Patient requesting ENT referral.  Encourage patient to follow-up with primary care doctor.  Less likely epistaxis, preseptal hematoma given no nasal injury or trauma.history given by patient.  Return precautions including worsening pain, fever.  Patient agreeable to plan all questions answered.                                   Medical Decision Making      Disposition and Plan     Clinical Impression:  1. Sore in nose    2. Chills         Disposition:  Discharge  3/18/2024 12:48 pm    Follow-up:  Iman Perkins MD  [address] [facility] [address]  [phone]    Call       Balaji Talley MD  [address] [facility]  [address]  [facility] [address]  [phone]    Schedule an appointment as soon as possible for a visit             Medications Prescribed:  Discharge Medication List as of 3/18/2024 12:48 PM

## 2024-03-18 NOTE — ED INITIAL ASSESSMENT (HPI)
Patient resting comfortably with  at bedside, call bell in reach.  Updated on plan of care Pt with \"sores in my nose\" since Friday that pt states recurs; also reporting chills, cough, and body aches since Friday as well; denies fever

## 2024-03-19 NOTE — TELEPHONE ENCOUNTER
Patient was seen in urgent care yesterday.     Disposition and Plan    Clinical Impression:  1. Sore in nose    2. Chills       Disposition:  Discharge  3/18/2024 12:48 pm     Follow-up:  Iman Perkins MD  [address] [facility] [address] [phone]     Call         Balaji Talley MD  [address] [facility] [address] [place] [id number] [phone]     Schedule an appointment as soon as possible for a visit      Medications Prescribed:  Discharge Medication List as of 3/18/2024 12:48 PM                   Electronically signed by Carmen Martinez PA-C at 3/18/2024  1:05 PM

## 2024-04-23 ENCOUNTER — OFFICE VISIT (OUTPATIENT)
Dept: GASTROENTEROLOGY | Facility: CLINIC | Age: 59
End: 2024-04-23

## 2024-04-23 ENCOUNTER — TELEPHONE (OUTPATIENT)
Dept: GASTROENTEROLOGY | Facility: CLINIC | Age: 59
End: 2024-04-23

## 2024-04-23 VITALS
SYSTOLIC BLOOD PRESSURE: 110 MMHG | HEART RATE: 57 BPM | DIASTOLIC BLOOD PRESSURE: 72 MMHG | WEIGHT: 226 LBS | BODY MASS INDEX: 38.58 KG/M2 | HEIGHT: 64 IN

## 2024-04-23 DIAGNOSIS — K58.2 IRRITABLE BOWEL SYNDROME WITH BOTH CONSTIPATION AND DIARRHEA: ICD-10-CM

## 2024-04-23 DIAGNOSIS — K21.9 GASTROESOPHAGEAL REFLUX DISEASE, UNSPECIFIED WHETHER ESOPHAGITIS PRESENT: ICD-10-CM

## 2024-04-23 DIAGNOSIS — R19.8 ALTERNATING CONSTIPATION AND DIARRHEA: ICD-10-CM

## 2024-04-23 DIAGNOSIS — R00.1 BRADYCARDIA: ICD-10-CM

## 2024-04-23 DIAGNOSIS — R11.0 NAUSEA WITHOUT VOMITING: ICD-10-CM

## 2024-04-23 DIAGNOSIS — R10.13 EPIGASTRIC PAIN: Primary | ICD-10-CM

## 2024-04-23 PROCEDURE — 99204 OFFICE O/P NEW MOD 45 MIN: CPT | Performed by: INTERNAL MEDICINE

## 2024-04-23 RX ORDER — PANTOPRAZOLE SODIUM 40 MG/1
40 TABLET, DELAYED RELEASE ORAL
Qty: 90 TABLET | Refills: 3 | Status: SHIPPED | OUTPATIENT
Start: 2024-04-23

## 2024-04-23 RX ORDER — SODIUM, POTASSIUM,MAG SULFATES 17.5-3.13G
SOLUTION, RECONSTITUTED, ORAL ORAL
Qty: 1 EACH | Refills: 0 | Status: SHIPPED | OUTPATIENT
Start: 2024-04-23

## 2024-04-23 NOTE — TELEPHONE ENCOUNTER
Patient will call us once she is ready to schedule.       Instructions and Information about Your Health    PLAN    - Please see cardiology     - Once cleared, let our office know so we can schedule the procedures    - Start pantoprazole 40 mg daily 30-60 minutes before your first meal    - Start miralax 1/2 -1 capful daily to keep the stools soft and regular    - Schedule the abdominal ultrasound when able    - Follow up in ~2 months and after the procedures      Instructions for the procedures  1. Schedule EGD and colonoscopy with MAC [Diagnosis: positive FOBT, epigastric pain, GERD]     2.  bowel prep from pharmacy (split dose Suprep)     3. Continue all medications for procedure

## 2024-04-23 NOTE — PATIENT INSTRUCTIONS
PLAN    - Please see cardiology     - Once cleared, let our office know so we can schedule the procedures    - Start pantoprazole 40 mg daily 30-60 minutes before your first meal    - Start miralax 1/2 -1 capful daily to keep the stools soft and regular    - Schedule the abdominal ultrasound when able    - Follow up in ~2 months and after the procedures     Instructions for the procedures  1. Schedule EGD and colonoscopy with MAC [Diagnosis: positive FOBT, epigastric pain, GERD]    2.  bowel prep from pharmacy (split dose Suprep)    3. Continue all medications for procedure    4. Read all bowel prep instructions carefully    5. AVOID seeds, nuts, popcorn, raw fruits and vegetables (cooked is okay) for 2-3 days before procedure    6. If you start any NEW medication after your visit today, please notify us. Certain medications will need to be held before the procedure, or the procedure cannot be performed.

## 2024-04-23 NOTE — H&P
[facility] - Gastroenterology                                                                                                               Reason for consult: epigastric pain, positive FOBT    Requesting physician or provider: Iman Perkins MD    Chief Complaint   Patient presents with    Abdominal Pain       HPI:   Bailee Kilgore is a 58 year old year-old female with history of IBS-M/C, fibromyalgia, migraines, bradycardia and vasovagal syncope:    Pt was diagnosed with IBS decades ago.  She has had more issues with constipation for at least a year. She often skips a day without having a BM and stools are often hard and difficult to pass.  Her abdomen feels distended and sometimes hard. She can feel nauseated occasionally and sometimes has to eat to avoid nausea or worsened abdominal pain.  Notes heartburn most days that waxes and wanes. Denies dysphagia.     Sometimes, she will take chocolate milk or eat ice cream to cause diarrhea.  If she develops diarrhea, she takes popcorn to help resolve the diarrhea.  She tries to avoid taking anything for her symptoms.     She tried fiber supplementation in the past, which didn't help - this was 2 years ago when she tried Metamucil. After taking it, she couldn't have a BM for 8 days.  She has also tried OTC laxatives, such as ex-lax, miralax, dulcolax, senna, and stool softeners such as colace.  She was told not to take laxatives long term.      She feels tired, which she was told could be related to her fibromyalgia.     FOBT came back positive 1/2024.     Her cardiologist in the past advised her to get a pacemaker or an ICD given her arrhythmia.      CBC and CMP 2/2024 wnl.      Denies fevers, chills, unintentional weight loss, blood in the stool, vomiting, or any other symptoms.     Prior endoscopies:  Last EGD was years ago, she thinks 2015 at [facility] - she  can't recall what it showed.   Last CLN was 2012 at [facility]- she had polyps and she's not sure when she was told to come back.      Results noted in CareEverywhere:  CLN and EGD at [facility] in 2008  Final  Clinical Data Repository*  This report may not match the original report   format  REPORT DATE:  6/16/2008      FINAL DIAGNOSIS  A. (Biopsies, terminal ileum): Unremarkable ileal mucosa.    B. (Random biopsies, colon): Unremarkable colonic mucosa.  No active,   collagenous, microscopic or eosinophilic colitis.    C. (Rectal polyp): Fragments of hyperplastic polyp.    D. (Biopsies, small bowel): Unremarkable proximal small intestinal mucosa.    E. (Gastric biopsies): Multiple pieces of gastric antral and antro-fundal   junction mucosa showing mild nonspecific gastritis.  No Helicobacter,   ulceration or dysplasia.  Special stain for Helicobacter is negative.      EGD 2013  Impression: Normal esophagus. Normal stomach. Normal duodenal bulb and 2nd   portion of the duodenum.     FINAL DIAGNOSIS  A. (Biopsies, stomach): Gastric antral and fundal mucosa showing mild   nonspecific gastritis. No Helicobacter, atrophy or dysplasia. Immunostain for   Helicobacter is negative.     Soc:  -denies smoking  -denies heavy Etoh  -no illicit drug use    Wt Readings from Last 6 Encounters:   04/23/24 226 lb (102.5 kg)   02/29/24 227 lb 12.8 oz (103.3 kg)   02/27/24 224 lb (101.6 kg)   02/27/24 224 lb (101.6 kg)   02/20/24 224 lb (101.6 kg)   12/11/23 220 lb 12.8 oz (100.2 kg)        History, Medications, Allergies, ROS:      Past Medical History:    Arrhythmia    Fibromyalgia    Irritable bowel syndrome    Migraines      Past Surgical History:   Procedure Laterality Date    Breast surgery      Colonoscopy      Total abdom hysterectomy        Family Hx:   Family History   Problem Relation Age of Onset    Cancer Paternal Grandmother         stomach cancer      Social History:   Social History     Socioeconomic History    Marital status:  Single   Tobacco Use    Smoking status: Never    Smokeless tobacco: Never   Vaping Use    Vaping status: Never Used   Substance and Sexual Activity    Alcohol use: Not Currently     Comment: socially     Drug use: Never     Social Determinants of Health     Food Insecurity: Low Risk  (8/27/2023)    [facility]    Food Insecurity     Have there been times that your food ran out, and you didn't have money to get more?: No     Are there times that you worry that this might happen?: No   Transportation Needs: Low Risk  (8/27/2023)    Received from [facility]    Transportation Needs     Do you have trouble getting transportation to medical appointments?: No     How do you normally get to and from your appointments?: Family/Friend   Recent Concern: Transportation Needs - High Risk (8/4/2023)    Received from [facility]    Transportation Needs     Do you have trouble getting transportation to medical appointments?: Yes     How do you normally get to and from your appointments?: Family/Friend    [facility]    Social Connections   Housing Stability:   Recent Concern: Housing Stability - High Risk (8/4/2023)    Received from [facility]    Housing Stability     Are you concerned about having a safe and reliable place to live?: Yes        Medications (Active prior to today's visit):  Current Outpatient Medications   Medication Sig Dispense Refill    pantoprazole 40 MG Oral Tab EC Take 1 tablet (40 mg total) by mouth every morning before breakfast. 90 tablet 3    Na Sulfate-K Sulfate-Mg Sulf (SUPREP BOWEL PREP KIT) 17.5-3.13-1.6 GM/177ML Oral Solution Take as directed 1 each 0    DULoxetine 20 MG Oral Cap DR Particles Take 1 capsule (20 mg total) by mouth daily. 60 capsule 0    cholecalciferol  (VITAMIN D3) 10 MCG (400 UNIT) Oral Tab Take 1 tablet (400 Units total) by mouth daily.      fluticasone propionate 50 MCG/ACT Nasal Suspension 1 spray by Nasal route daily.      aspirin 81 MG Oral Chew Tab Chew 1 tablet (81 mg total) by mouth daily.      fluconazole 150 MG Oral Tab Take 1 tablet (150 mg total) by mouth. (Patient not taking: Reported on 2/29/2024)      rosuvastatin 10 MG Oral Tab Take 1 tablet (10 mg total) by mouth nightly. (Patient not taking: Reported on 2/20/2024) 90 tablet 1    fluPHENAZine 5 MG Oral Tab Take 1 tablet (5 mg total) by mouth 2 (two) times daily. (Patient not taking: Reported on 2/20/2024)      benztropine 0.5 MG Oral Tab Take 1 tablet (0.5 mg total) by mouth every 4 (four) hours as needed. (Patient not taking: Reported on 2/20/2024)      cyclobenzaprine 5 MG Oral Tab Take 1 tablet (5 mg total) by mouth nightly as needed for Muscle spasms. (Patient not taking: Reported on 2/20/2024) 30 tablet 2       Allergies:  Allergies   Allergen Reactions    Haloperidol NAUSEA AND VOMITING    Ziprasidone ITCHING    Zolmitriptan NAUSEA ONLY       ROS:   CONSTITUTIONAL:  negative for fevers, rigors  EYES:  negative for diplopia   RESPIRATORY:  negative for severe shortness of breath  CARDIOVASCULAR:  negative for crushing sub-sternal chest pain  GASTROINTESTINAL:  see HPI  GENITOURINARY:  negative for dysuria or gross hematuria  INTEGUMENT/BREAST:  SKIN:  negative for jaundice   ALLERGIC/IMMUNOLOGIC:  negative for hay fever  ENDOCRINE:  negative for cold intolerance and heat intolerance  MUSCULOSKELETAL:  negative for joint effusion/severe erythema  BEHAVIOR/PSYCH:  negative for psychotic behavior      PHYSICAL EXAM:   Blood pressure 110/72, pulse 57, height 5' 4\" (1.626 m), weight 226 lb (102.5 kg).    GEN - Patient appears comfortable and in no acute discomfort  ENT - MMM  EYES - the sclera appears anicteric  CV - no edema  RESP -  No increased work of breathing  ABDOMEN - soft, non-tender  exam in all quadrants without rigidity or guarding, non-distended, no abnormal bowel sounds noted, no masses are palpated  SKIN - No jaundice  NEURO - Alert and appropriate, and gross movements of extremities normal  PSYCH - normal affect, non-agitated      Labs/Imaging:     Patient's pertinent labs and imaging were reviewed and discussed with patient today.      .  ASSESSMENT/PLAN:   Bailee Kilgore is a 58 year old year-old female with history of IBS-M/C, fibromyalgia, migraines, bradycardia and vasovagal syncope:    Epigastric pain  Nausea without vomiting  Dyspepsia   Constipation, change in bowel habits  Abdominal distension  GERD  Positive FOBT  IBS-M  Fibromyalgia  H/o bradycardia and vasovagal syncope - pt's previous cardiologist advised a pacemaker or ICD. She doesn't have a cardiologist currently.      The pt has had the symptoms above for years, which have recently worsened over the past several months.  She has GERD and constipation that are not controlled and recently unremarkable CBC, CMP, and negative H. Pylori breath test.  Her FOBT is positive, which warrants a CLN.  Recommend starting pantoprazole and miralax daily and luminal evaluation with an EGD and CLN given the persistent pain and change in bowel habits. She will need cardiac clearance prior to any endoscopic evaluation.      Recommend    - Referral to cardiology     - Once cleared from a cardiac standpoint, she will let the GI office know so her procedures can be scheduled    - EGD and CLN with [facility]    - CLD the day prior, NPO after midnight, split dose Suprep    - Start pantoprazole 40 mg daily 30-60 minutes before the first meal    - Start miralax 1/2 -1 capful daily to keep the stools soft and regular    - US abd to eval for biliary source    - Schedule the abdominal ultrasound when able    - Follow up in ~2 months and after the procedures     EGD and Colonoscopy consent: I have discussed the risks, benefits, and alternatives to  colonoscopy and EGD with the patient/primary decision maker [who demonstrated understanding], including but not limited to the risks of bleeding, infection, pain, death, as well as the risks of anesthesia and perforation all leading to prolonged hospitalization, surgical intervention, or even death. I also mentioned the miss rate of EGD and colonoscopy of 5-10% in the best of all circumstances. The patient has agreed to sign an informed consent and elected to proceed with the procedures with possible intervention [i.e. polypectomy, stent placement, etc.] as indicated.      Orders This Visit:  No orders of the defined types were placed in this encounter.      Meds This Visit:  Requested Prescriptions     Signed Prescriptions Disp Refills    pantoprazole 40 MG Oral Tab EC 90 tablet 3     Sig: Take 1 tablet (40 mg total) by mouth every morning before breakfast.    Na Sulfate-K Sulfate-Mg Sulf (SUPREP BOWEL PREP KIT) 17.5-3.13-1.6 GM/177ML Oral Solution 1 each 0     Sig: Take as directed       Imaging & Referrals:  CARDIO - INTERNAL         Jennifer William MD          This note was partially prepared using Dragon Medical voice recognition dictation software. As a result, errors may occur. When identified, these errors have been corrected. While every attempt is made to correct errors during dictation, discrepancies may still exist.

## 2024-07-08 ENCOUNTER — TELEPHONE (OUTPATIENT)
Dept: INTERNAL MEDICINE CLINIC | Facility: CLINIC | Age: 59
End: 2024-07-08

## 2024-07-08 NOTE — TELEPHONE ENCOUNTER
GI consult completed 4/23/24. Colonoscopy has not been scheduled.   Left message to call back.

## 2024-09-17 ENCOUNTER — HOSPITAL ENCOUNTER (OUTPATIENT)
Dept: ULTRASOUND IMAGING | Facility: HOSPITAL | Age: 59
Discharge: HOME | End: 2024-09-17
Attending: INTERNAL MEDICINE
Payer: MEDICARE

## 2024-09-17 DIAGNOSIS — R10.9 ABDOMINAL PAIN, UNSPECIFIED ABDOMINAL LOCATION: ICD-10-CM

## 2024-09-17 PROCEDURE — 76705 ECHO EXAM OF ABDOMEN: CPT | Performed by: INTERNAL MEDICINE

## 2024-09-19 DIAGNOSIS — K76.0 FATTY LIVER: Primary | ICD-10-CM

## 2024-09-24 ENCOUNTER — TELEPHONE (OUTPATIENT)
Dept: INTERNAL MEDICINE CLINIC | Facility: CLINIC | Age: 59
End: 2024-09-24

## 2024-09-24 DIAGNOSIS — R10.9 ABDOMINAL PAIN, UNSPECIFIED ABDOMINAL LOCATION: Primary | ICD-10-CM

## 2024-09-24 DIAGNOSIS — R10.13 EPIGASTRIC PAIN: ICD-10-CM

## 2024-09-24 NOTE — TELEPHONE ENCOUNTER
----- Message from Chandni SHETTY sent at 9/20/2024  4:04 PM CDT -----  Patient was advised of 's notes and verbalized she has further questions.     Patient has further questions on why pain is on the left upper side of her abdomen Patient states she literally has to sit down and eat something or drink something for it to go away.     Patient would prefer a female GI doctor. Please advise.

## 2024-10-31 ENCOUNTER — OFFICE VISIT (OUTPATIENT)
Dept: INTERNAL MEDICINE CLINIC | Facility: CLINIC | Age: 59
End: 2024-10-31

## 2024-10-31 VITALS
HEART RATE: 56 BPM | SYSTOLIC BLOOD PRESSURE: 130 MMHG | BODY MASS INDEX: 41.72 KG/M2 | OXYGEN SATURATION: 99 % | HEIGHT: 64 IN | DIASTOLIC BLOOD PRESSURE: 70 MMHG | WEIGHT: 244.38 LBS

## 2024-10-31 DIAGNOSIS — R00.1 BRADYCARDIA: ICD-10-CM

## 2024-10-31 DIAGNOSIS — G47.33 OSA (OBSTRUCTIVE SLEEP APNEA): ICD-10-CM

## 2024-10-31 DIAGNOSIS — E66.01 MORBID OBESITY (HCC): ICD-10-CM

## 2024-10-31 DIAGNOSIS — F33.1 MODERATE EPISODE OF RECURRENT MAJOR DEPRESSIVE DISORDER (HCC): ICD-10-CM

## 2024-10-31 DIAGNOSIS — E78.00 PURE HYPERCHOLESTEROLEMIA: ICD-10-CM

## 2024-10-31 DIAGNOSIS — M79.7 FIBROMYALGIA: ICD-10-CM

## 2024-10-31 DIAGNOSIS — R82.998 FROTHY URINE: Primary | ICD-10-CM

## 2024-10-31 DIAGNOSIS — Z87.898 HISTORY OF SYNCOPE: ICD-10-CM

## 2024-10-31 DIAGNOSIS — F41.1 GENERALIZED ANXIETY DISORDER: ICD-10-CM

## 2024-10-31 DIAGNOSIS — K58.2 IRRITABLE BOWEL SYNDROME WITH BOTH CONSTIPATION AND DIARRHEA: ICD-10-CM

## 2024-10-31 DIAGNOSIS — I25.10 CORONARY ARTERY DISEASE INVOLVING NATIVE CORONARY ARTERY OF NATIVE HEART WITHOUT ANGINA PECTORIS: ICD-10-CM

## 2024-10-31 DIAGNOSIS — R73.03 PREDIABETES: ICD-10-CM

## 2024-10-31 LAB
BILIRUB UR QL: NEGATIVE
COLOR UR: YELLOW
GLUCOSE UR-MCNC: NORMAL MG/DL
HEMOGLOBIN A1C: 5.7 % (ref 4.3–5.6)
HGB UR QL STRIP.AUTO: NEGATIVE
HYALINE CASTS #/AREA URNS AUTO: PRESENT /LPF
KETONES UR-MCNC: NEGATIVE MG/DL
LEUKOCYTE ESTERASE UR QL STRIP.AUTO: NEGATIVE
PH UR: 6.5 [PH] (ref 5–8)
PROT UR-MCNC: 20 MG/DL
SP GR UR STRIP: 1.03 (ref 1–1.03)
UROBILINOGEN UR STRIP-ACNC: NORMAL

## 2024-10-31 PROCEDURE — 99214 OFFICE O/P EST MOD 30 MIN: CPT | Performed by: INTERNAL MEDICINE

## 2024-10-31 PROCEDURE — 83036 HEMOGLOBIN GLYCOSYLATED A1C: CPT | Performed by: INTERNAL MEDICINE

## 2024-10-31 RX ORDER — LINACLOTIDE 72 UG/1
72 CAPSULE, GELATIN COATED ORAL 2 TIMES DAILY
Qty: 30 CAPSULE | Refills: 0 | Status: SHIPPED | OUTPATIENT
Start: 2024-10-31 | End: 2024-11-30

## 2024-10-31 NOTE — PROGRESS NOTES
Bailee Kilgore is a 59 year old female with complaints of:  Chief Complaint: Establish Care, Urinary (Sweet odor coming from urine, wondering if she is now diabetic), and Stomach Pain (Stomach pain/bloating and constipation.)    HPI     Bailee Kilgore is a(n) 59 year old female with a history of fatty liver, bradycardia, CAD s/p angioplasty, abdominal pain, fibromyalgia, GERD, pre-diabetes, HLD, who presents for follow up and establish care.     Patient states that her urine smells sweet. Patient has gained a lot of weight back. Has an increase in appetite and has hyperphagia because her abdominal pain feels better after eating. She is not taking the PPI regularly.     Patient history of abdominal pain.  She was seen by gastroenterology on 4/23/2024.  She was diagnosed with IBS decades ago.  Lately, she has had more constipation for the last year at least.  Has tried fiber supplementation in the past, which did not help.  Has tried OTC laxatives, MiraLAX, Dulcolax, senna, and stool softener such as Colace.  Has also had a positive FOBT in the past.  Has history of GERD.  Negative H. pylori breath test.  Has been recommended to start pantoprazole and MiraLAX daily for these above issues.  Furthermore, she was recommended luminal examination with colonoscopy and EGD.  Also recommended to have an ultrasound of the abdomen to examine for biliary source of the pain.  Ultrasound was done 9/17/2024, which was notable for increased echogenicity of the liver, likely fatty infiltration versus hepatic parenchymal disease.  Gallbladder, biliary tree were normal.  Pancreas was not able to be examined due to obscuration by bowel gas.     Furthermore, patient has a history of bradycardia with vasovagal syncope.  She has seen cardiology in the past, has since ceased following with them.  She was advised to get a pacemaker or ICD.  Per gastroenterology.  She would need to see [facility] cardiologist to be cleared from a  cardiac standpoint before proceeding with any procedures.    Patient was seen in the emergency department on 2/27/2024 complaining of right upper quadrant and chest pain.  In the ER, EKG, chest x-ray were done; all normal.  MRI was done no evidence of stroke.  CT scan of the chest showed no evidence of PE.  Troponin, CBC, chemistries all normal.  Patient was given omeprazole and asked to follow-up with primary care.  She had a stress test done 8/20/2023, which was negative.  Upon following up with primary care later that week, H. pylori breath test was ordered, and STD screening with testing for hep B, HIV, RPR, chlamydia/GC was done --all negative.     Patient also has a history of fibromyalgia.  Patient is seeing rheumatology for this last seen 2/20/2024.  Patient had been intolerant of gabapentin, lyrica, and flexeril. Patient is +JANAY, but otherwise AI work up was neg. She continues on duloxetine.     Prediabetes. A1C in 8/2023 was 5.8.     HLD. Not currently on a cholesterol reducing medication.     OAB. Chronic pelvic pain.     Migraines.     JORGE.       Past Medical History     Past Medical History:    Arrhythmia    Fibromyalgia    Irritable bowel syndrome    Migraines        Past Surgical History     Past Surgical History:   Procedure Laterality Date    Breast surgery      Colonoscopy      Total abdom hysterectomy          Family History     Family History   Problem Relation Age of Onset    Cancer Paternal Grandmother         stomach cancer       Social History     Social History     Socioeconomic History    Marital status: Single   Tobacco Use    Smoking status: Never    Smokeless tobacco: Never   Vaping Use    Vaping status: Never Used   Substance and Sexual Activity    Alcohol use: Not Currently     Comment: socially     Drug use: Never     Social Drivers of Health     Food Insecurity: Low Risk  (8/27/2023)    [facility]    [employer]  Insecurity     Have there been times that your food ran out, and you didn't have money to get more?: No     Are there times that you worry that this might happen?: No   Transportation Needs: Low Risk  (8/27/2023)    [facility]    Transportation Needs     Do you have trouble getting transportation to medical appointments?: No     How do you normally get to and from your appointments?: Family/Friend   Recent Concern: Transportation Needs - High Risk (8/4/2023)    Received from [facility]    Transportation Needs     Do you have trouble getting transportation to medical appointments?: Yes     How do you normally get to and from your appointments?: Family/Friend    [facility]    Social Connections   Housing Stability:   Recent Concern: Housing Stability - High Risk (8/4/2023)    Received from [facility]    Housing Stability     Are you concerned about having a safe and reliable place to live?: Yes       Allergies     Allergies[1]    Current Medications     Current Outpatient Medications   Medication Sig Dispense Refill    DULoxetine 20 MG Oral Cap DR Particles Take 1 capsule (20 mg total) by mouth daily. 60 capsule 0    cholecalciferol (VITAMIN D3) 10 MCG (400 UNIT) Oral Tab Take 1 tablet (400 Units total) by mouth daily.      aspirin 81 MG Oral Chew Tab Chew 1 tablet (81 mg total) by mouth daily.      pantoprazole 40 MG Oral Tab EC Take 1 tablet (40 mg total) by mouth every morning before breakfast. (Patient not taking: Reported on 10/31/2024) 90 tablet 3    Na Sulfate-K Sulfate-Mg Sulf (SUPREP BOWEL PREP KIT) 17.5-3.13-1.6 GM/177ML Oral Solution Take as directed (Patient not taking: Reported on 10/31/2024) 1 each 0    fluconazole 150 MG Oral Tab Take 1 tablet (150 mg total) by mouth. (Patient not taking: Reported on 10/31/2024)       fluPHENAZine 5 MG Oral Tab Take 1 tablet (5 mg total) by mouth 2 (two) times daily. (Patient not taking: Reported on 10/31/2024)      benztropine 0.5 MG Oral Tab Take 1 tablet (0.5 mg total) by mouth every 4 (four) hours as needed. (Patient not taking: Reported on 10/31/2024)      cyclobenzaprine 5 MG Oral Tab Take 1 tablet (5 mg total) by mouth nightly as needed for Muscle spasms. (Patient not taking: Reported on 10/31/2024) 30 tablet 2    fluticasone propionate 50 MCG/ACT Nasal Suspension 1 spray by Nasal route daily. (Patient not taking: Reported on 10/31/2024)       No current facility-administered medications for this visit.       Review of Systems     GENERAL HEALTH: feels well otherwise  SKIN: denies any unusual skin lesions or rashes  RESPIRATORY: denies shortness of breath with exertion  CARDIOVASCULAR: denies chest pain on exertion  GI: denies abdominal pain and denies heartburn  : denies any burning with urination, urinary frequency or urgency  NEURO: denies headaches, numbness or tingling, mental status changes  PSYCH: denies depressed mood, anxiety  MUSC: denies muscle aches, joint pain    Physical Exam     /70 (BP Location: Left arm, Patient Position: Sitting, Cuff Size: large)   Pulse 56   Ht 5' 4\" (1.626 m)   Wt 244 lb 6.4 oz (110.9 kg)   SpO2 99%   BMI 41.95 kg/m²     GENERAL: well developed, well nourished,in no apparent distress  SKIN: no rashes,no suspicious lesions  HEENT: atraumatic, normocephalic,ears and throat are clear  NECK: supple,no adenopathy,no bruits  LUNGS: clear to auscultation  CARDIO: RRR without murmur  GI: good BS's,no masses, HSM or tenderness  EXTREMITIES: no cyanosis, clubbing or edema    Assessment and Plan     Diagnoses and all orders for this visit:    Frothy urine. No LUTS otherwise. Check UA to r/o infection or proteinuria.   -     Urinalysis with Culture Reflex; Future  -     POC Glycohemoglobin [[id number]]    Prediabetes. A1C 5.7.   -     Urinalysis with  Culture Reflex; Future  -     POC Glycohemoglobin [[id number]]    Coronary artery disease involving native coronary artery of native heart without angina pectoris. Bradycardia. History of syncope. Needs to follow with cardiology. Referral once again given.   -     Cardio Referral - Internal    Irritable bowel syndrome with both constipation and diarrhea. Now predominantly constipation. GERD. + FOBT. PUD? H pylori neg. Needs to take her PPI. Can also start sucralfate. Linzess ordered for constipation. If patient cannot afford the linzess, can try milk of magnesia to evacuate bowel. Cont bowel supplements. Needs to see cardiology prior to EGD/colon.     Fatty liver. Iso obesity and metabolic syndrome. US w/ fatty liver. Biliary tree and gallbladder normal.     Fibromyalgia. Cont duloxetine. Follow w/ rheum.     Moderate episode of recurrent major depressive disorder (HCC). Generalized anxiety disorder. Cont duloxetine. Consider referral to [facility]. Patient is reluctant to accept that she has any mental health diagnosis.     Pure hypercholesterolemia. Needs to be restarted on statin iso CAD.     Morbid obesity (HCC). C/b fatty liver, CAD, JORGE, preDM.     JORGE (obstructive sleep apnea). On CPAP? May need to be referred to pulm.     Other orders  -     linaCLOtide (LINZESS) 72 MCG Oral Cap; Take 72 mcg by mouth in the morning and 72 mcg before bedtime.          linaCLOtide (LINZESS) 72 MCG Oral Cap Take 72 mcg by mouth in the morning and 72 mcg before bedtime. 30 capsule 0    DULoxetine 20 MG Oral Cap DR Particles Take 1 capsule (20 mg total) by mouth daily. 60 capsule 0    cholecalciferol (VITAMIN D3) 10 MCG (400 UNIT) Oral Tab Take 1 tablet (400 Units total) by mouth daily.      aspirin 81 MG Oral Chew Tab Chew 1 tablet (81 mg total) by mouth daily.         Requested Prescriptions     Signed Prescriptions Disp Refills    linaCLOtide (LINZESS) 72 MCG Oral Cap 30 capsule 0     Sig: Take 72 mcg by mouth in the morning and 72 mcg  before bedtime.       Orders Placed This Encounter   Procedures    Urinalysis with Culture Reflex     Standing Status:   Future     Number of Occurrences:   1     Standing Expiration Date:   10/31/2025     Order Specific Question:   Release to patient     Answer:   Immediate    POC Glycohemoglobin [[id number]]     Order Specific Question:   Release to patient     Answer:   Immediate       No follow-ups on file.    The patient indicates understanding of these issues and agrees to the plan.    Electronically signed by Roseann Garcia MD 10/31/24             [1]   Allergies  Allergen Reactions    Haloperidol NAUSEA AND VOMITING    Ziprasidone ITCHING    Zolmitriptan NAUSEA ONLY

## 2024-10-31 NOTE — PATIENT INSTRUCTIONS
For the acid reflux/upper abdominal pain:  - please start taking the acid reducing medication (pantoprazole 40 mg) every day in the morning with food.   - please start Sucralfate with meals. This is available over the counter.     For the constipation:  - start Linzess 1-2 tabs a day as needed for the constipation.   - if Linzess is not available/affordable, please take 1-2 doses of milk of magnesia to start evacuation of the bowel.   - once you start to move the bowels, stop the milk of magnesia.   - continue the fiber supplement     Follow up with cardiology. I have given you a referral to one of the cardiology APNs, named Tianna De Oliveira.

## 2024-11-01 RX ORDER — CIPROFLOXACIN 500 MG/1
500 TABLET, FILM COATED ORAL 2 TIMES DAILY
Qty: 10 TABLET | Refills: 0 | Status: SHIPPED | OUTPATIENT
Start: 2024-11-01 | End: 2024-11-06

## 2024-11-12 ENCOUNTER — OFFICE VISIT (OUTPATIENT)
Dept: RHEUMATOLOGY | Facility: CLINIC | Age: 59
End: 2024-11-12

## 2024-11-12 VITALS
HEIGHT: 64 IN | WEIGHT: 245 LBS | BODY MASS INDEX: 41.83 KG/M2 | DIASTOLIC BLOOD PRESSURE: 73 MMHG | HEART RATE: 48 BPM | SYSTOLIC BLOOD PRESSURE: 125 MMHG

## 2024-11-12 DIAGNOSIS — M25.50 POLYARTHRALGIA: ICD-10-CM

## 2024-11-12 DIAGNOSIS — M79.7 FIBROMYALGIA: Primary | ICD-10-CM

## 2024-11-12 DIAGNOSIS — G89.29 CHRONIC PAIN OF RIGHT KNEE: ICD-10-CM

## 2024-11-12 DIAGNOSIS — M25.561 CHRONIC PAIN OF RIGHT KNEE: ICD-10-CM

## 2024-11-12 PROCEDURE — 99214 OFFICE O/P EST MOD 30 MIN: CPT | Performed by: INTERNAL MEDICINE

## 2024-11-12 PROCEDURE — 20610 DRAIN/INJ JOINT/BURSA W/O US: CPT | Performed by: INTERNAL MEDICINE

## 2024-11-12 RX ORDER — TRIAMCINOLONE ACETONIDE 40 MG/ML
40 INJECTION, SUSPENSION INTRA-ARTICULAR; INTRAMUSCULAR ONCE
Status: COMPLETED | OUTPATIENT
Start: 2024-11-12 | End: 2024-11-12

## 2024-11-12 RX ORDER — DULOXETIN HYDROCHLORIDE 20 MG/1
20 CAPSULE, DELAYED RELEASE ORAL DAILY
Qty: 90 CAPSULE | Refills: 1 | Status: SHIPPED | OUTPATIENT
Start: 2024-11-12

## 2024-11-12 NOTE — PROCEDURES
With paitent's consent, I injected pt's Right knee with 1ml lidocaine 1 % and 1 ml kenalog 40. It was done under sterile technique using iodine and alcohol swabs and ethyl chloride was used as an anaesthetic spray. Pt.  tolerated it well.

## 2024-11-12 NOTE — PATIENT INSTRUCTIONS
You were seen today for fibromyalgia  Recommend to take the Cymbalta daily to see if it will help  We injected your right knee with cortisone  I also gave you another referral for physical therapy  Follow-up in 6 months

## 2024-11-12 NOTE — PROGRESS NOTES
Bailee Kilgore is a 59 year old female.    HPI:     Chief Complaint   Patient presents with    Fibromyalgia Syndrome    Follow - Up     Lab results discussion        I had the pleasure of seeing Bailee Kilgore on 11/12/2024 for follow up Fibromyalgia     Current Medications:  Cymbalta 20 mg daily- started 2/2024  Past medications:  Flexeril- caused drowsiness   Gabapentin- had a reaction  Lyrica- swelling and weight gain, it did help  Blood work:  +JANAY 1:80, Neg TRENTON panel  Neg ESR, CRP, RF, CCP, HLA-B27    Interval History:  This is a 57 yo F with hx of IBS, Pre-DM, HLD, CAD s/p angioplasty in 2015, Fatty liver, Migraine's presents to establish care for Fibromyalgia.  She reports chronic joint and muscle pain diffusely.  When she wakes up in the morning she has significant pain in her feet, she states they swell.  It is hard to walk in the morning due to the pain in her feet.  Her legs give out.  She fell down the stairs yesterday as her legs gave out.  Her legs will also swell.  Last year she fell 3 times.  She feels electric shocks in her legs that have been for a few seconds and then her legs gave out.  She states that her right knee can swell.  She has sciatica on her right side intermittently.  At times she has to drag her right leg due to the pain.  She also has restless leg syndrome.  She reports a lot of pain in her shoulders, shoulder blade and trapezius region.  She has pain in her hands.  She was worked up for lupus in the past and was negative.  She states that lupus and MS run in her family.  She reports a history of eczema.  She reports dry eyes and dry mouth.  Denies any history of serositis, DVT or PE, miscarriages, RP.  She reports memory issues, brain fog.  She was on Cymbalta and gabapentin in the past but had side effects.  She also tried Flexeril but did not help.  Her main issue is joint pain involving her right knee and significant pain in both feet.    11/12/2024:  Presents  for follow-up of fibromyalgia  She is now on Cymbalta 20 mg but was taking as needed not daily  It did help help some of her pain  Having worsening right knee pain with swelling. She was given a PT referral but was not able to do it  Also has lower back pain  She currently has a UTI and finished Abx last week        HISTORY:  Past Medical History:    Arrhythmia    Fibromyalgia    Irritable bowel syndrome    Migraines      Social Hx Reviewed   Family Hx Reviewed     Medications (Active prior to today's visit):  Current Outpatient Medications   Medication Sig Dispense Refill    linaCLOtide (LINZESS) 72 MCG Oral Cap Take 72 mcg by mouth in the morning and 72 mcg before bedtime. 30 capsule 0    pantoprazole 40 MG Oral Tab EC Take 1 tablet (40 mg total) by mouth every morning before breakfast. (Patient not taking: Reported on 10/31/2024) 90 tablet 3    Na Sulfate-K Sulfate-Mg Sulf (SUPREP BOWEL PREP KIT) 17.5-3.13-1.6 GM/177ML Oral Solution Take as directed (Patient not taking: Reported on 10/31/2024) 1 each 0    fluconazole 150 MG Oral Tab Take 1 tablet (150 mg total) by mouth. (Patient not taking: Reported on 10/31/2024)      DULoxetine 20 MG Oral Cap DR Particles Take 1 capsule (20 mg total) by mouth daily. 60 capsule 0    fluPHENAZine 5 MG Oral Tab Take 1 tablet (5 mg total) by mouth 2 (two) times daily. (Patient not taking: Reported on 10/31/2024)      benztropine 0.5 MG Oral Tab Take 1 tablet (0.5 mg total) by mouth every 4 (four) hours as needed. (Patient not taking: Reported on 10/31/2024)      cyclobenzaprine 5 MG Oral Tab Take 1 tablet (5 mg total) by mouth nightly as needed for Muscle spasms. (Patient not taking: Reported on 10/31/2024) 30 tablet 2    cholecalciferol (VITAMIN D3) 10 MCG (400 UNIT) Oral Tab Take 1 tablet (400 Units total) by mouth daily.      fluticasone propionate 50 MCG/ACT Nasal Suspension 1 spray by Nasal route daily. (Patient not taking: Reported on 10/31/2024)      aspirin 81 MG Oral Chew  Tab Chew 1 tablet (81 mg total) by mouth daily.       .cmed  Allergies:  Allergies[1]      ROS:   All other ROS are negative.     PHYSICAL EXAM:   GEN: AAOx3, NAD  HEENT: EOMI, PERRLA, no injection or icterus, oral mucosa moist, no oral lesions. No lymphadenopathy. No facial rash  CVS: RRR, no murmurs rubs or gallops. Equal 2+ distal pulses.   LUNGS: CTAB, no increased work of breathing  ABDOMEN:  soft NT/ND, +BS, no HSM  SKIN: No rashes or skin lesions. No nail findings  MSK:  No synovitis or swelling on exam  Full range of motion in the shoulders  TTP in the paraspinal region and trapezius region  NEURO: Cranial nerves II-XII intact grossly. 5/5 strength throughout in both upper and lower extremities, sensation intact.  PSYCH: normal mood       LABS:     Component      Latest Ref Rng 2/20/2024   Anti-SSA Antibody, IGG      <7 U/mL <0.4    Anti-SSB Antibody, IGG      <7 U/mL <0.4    Anti-Smith Antibody, IGG      <7 U/mL <0.7    Anti-U1RNP Antibody, IGG      <5 U/mL 1.0    Anti-RNP70 Antibody, IGG      <7 U/mL 1.1    Anti-Centromere Antibody, IGG      <7 U/mL <0.4    Anti-SCL70 Antibody, IGG      <7 U/mL <0.6    Anti-Pearl-1 Antibody, IGG      <7 U/mL <0.3    JANAY Titer/Pattern      <80  80 !    Reviewed By: Kelli Hodges M.D.    C-REACTIVE PROTEIN      <1.00 mg/dL <0.40    SED RATE      0 - 30 mm/Hr 18    JANAY SCREEN WITH REFLEX (S)      Negative  Positive !    RHEUMATOID FACTOR      <14 IU/mL <10    C-Citrullinated Peptide IgG AB      0.0 - 6.9 U/mL 1.3    HLA-B27 Negative    Anti Double Strand DNA      <10  <10         Imaging:     XR right knee 10/2023:  No acute osseous process.   Mild to moderate tricompartmental osteoarthritis.     ASSESSMENT/PLAN:     Diffuse myofascial pain, polyarthralgias  - She reports significant muscle pain in her paraspinal region and trapezius region.  Also has a lot of joint pain involving her right knee and feet  - No evidence of synovitis or swelling on exam.  No history of  psoriasis  - She was on gabapentin, Lyrica and Flexeril in the past but had side effects  - She is on Cymbalta 20 mg but was taking as needed.  She will try taking it daily to see if it helps    Right knee pain secondary to osteoarthritis  - Right knee was injected with 1 cc of lidocaine and 40 mg of Kenalog in sterile fashion.  Patient tolerated procedure well  - Referred to physical therapy    Positive JANAY  - She was found to have a positive JANAY 1: 80 but negative TRENTON panel.  Symptoms are not consistent with a connective tissue disease  - I explained that the JANAY is a non-specific test and does not necessarily indicate a diagnosis of autoimmune rheumatologic diesease. For example, autoantibodies also are detected in patients with organ-specific autoimmune diseases, such as autoimmune thyroiditis and hepatitis, certain infections such as hepatitis, and malignancies. In general, 20-30% of normal patients may have an JANAY titer of 1:40, 10-12% may have a titer of 1:80, and about 5% may have a titer of greater than or equal to 1:160     Pt will f/u in 6 mos    There is a longitudinal care relationship with me, the care plan reflects the ongoing nature of the continuous relationship of care, and the medical record indicates that there is ongoing treatment of a serious/complex medical condition which I am currently managing.  is Applicable.     Tory Simmons MD  11/12/2024   8:59 AM                 [1]   Allergies  Allergen Reactions    Haloperidol NAUSEA AND VOMITING    Ziprasidone ITCHING    Zolmitriptan NAUSEA ONLY

## 2025-01-27 ENCOUNTER — NURSE TRIAGE (OUTPATIENT)
Dept: INTERNAL MEDICINE CLINIC | Facility: CLINIC | Age: 60
End: 2025-01-27

## 2025-01-27 NOTE — TELEPHONE ENCOUNTER
Patient complains of viral symptoms that started 7 days ago; has sore throat, congestion; headaches, has cough/productive, phlegmy.     Also mentioned odor in urine. Has irritation with urination. Noticed 2 days ago.   Last treated for UTI 11/1/24 with cipro 500mg.     Took Aleve D, and sudafed which helped relieve headache.   No fever. No short of breathe.     Patient needs evaluation. Appointment given 1/28/25.   Patient to rest, and push fluids.     Reason for Disposition   Continuous (nonstop) coughing interferes with work or school and no improvement using cough treatment per Care Advice    Protocols used: Cough-A-OH

## 2025-04-15 ENCOUNTER — TELEPHONE (OUTPATIENT)
Dept: INTERNAL MEDICINE CLINIC | Facility: CLINIC | Age: 60
End: 2025-04-15

## 2025-04-15 NOTE — TELEPHONE ENCOUNTER
Patient called states she had a visit scheduled today with Dr. Carney and she is asking if there any sooner visits--> no sooner visits available and she will keep scheduled visit. Patient verbalized understanding. No further questions or concerns at this time.    Future Appointments   Date Time Provider Department Center   4/15/2025  2:30 PM Ling Carney MD [facility]

## 2025-04-29 ENCOUNTER — TELEPHONE (OUTPATIENT)
Dept: INTERNAL MEDICINE CLINIC | Facility: CLINIC | Age: 60
End: 2025-04-29

## 2025-04-30 ENCOUNTER — HOSPITAL ENCOUNTER (EMERGENCY)
Facility: HOSPITAL | Age: 60
Discharge: HOME | End: 2025-04-30
Attending: EMERGENCY MEDICINE
Payer: MEDICARE

## 2025-04-30 ENCOUNTER — APPOINTMENT (OUTPATIENT)
Dept: GENERAL RADIOLOGY | Facility: HOSPITAL | Age: 60
End: 2025-04-30
Attending: EMERGENCY MEDICINE
Payer: MEDICARE

## 2025-04-30 VITALS
HEART RATE: 61 BPM | WEIGHT: 220 LBS | OXYGEN SATURATION: 99 % | SYSTOLIC BLOOD PRESSURE: 126 MMHG | BODY MASS INDEX: 37.56 KG/M2 | DIASTOLIC BLOOD PRESSURE: 87 MMHG | RESPIRATION RATE: 18 BRPM | TEMPERATURE: 98 F | HEIGHT: 64 IN

## 2025-04-30 DIAGNOSIS — S80.02XA CONTUSION OF LEFT KNEE, INITIAL ENCOUNTER: Primary | ICD-10-CM

## 2025-04-30 DIAGNOSIS — S93.402A MILD SPRAIN OF LEFT ANKLE, INITIAL ENCOUNTER: ICD-10-CM

## 2025-04-30 PROCEDURE — 99284 EMERGENCY DEPT VISIT MOD MDM: CPT

## 2025-04-30 PROCEDURE — 99283 EMERGENCY DEPT VISIT LOW MDM: CPT

## 2025-04-30 PROCEDURE — 73610 X-RAY EXAM OF ANKLE: CPT | Performed by: EMERGENCY MEDICINE

## 2025-04-30 PROCEDURE — 73560 X-RAY EXAM OF KNEE 1 OR 2: CPT | Performed by: EMERGENCY MEDICINE

## 2025-04-30 RX ORDER — ASPIRIN 325 MG
325 TABLET ORAL ONCE
Status: COMPLETED | OUTPATIENT
Start: 2025-04-30 | End: 2025-04-30

## 2025-04-30 RX ORDER — IBUPROFEN 600 MG/1
600 TABLET, FILM COATED ORAL ONCE
Status: COMPLETED | OUTPATIENT
Start: 2025-04-30 | End: 2025-04-30

## 2025-04-30 RX ORDER — IBUPROFEN 600 MG/1
600 TABLET, FILM COATED ORAL EVERY 8 HOURS PRN
Qty: 14 TABLET | Refills: 0 | Status: SHIPPED | OUTPATIENT
Start: 2025-04-30 | End: 2025-05-07

## 2025-04-30 NOTE — ED PROVIDER NOTES
Patient Seen in: [facility] Emergency Department    History     Chief Complaint   Patient presents with    Leg Pain    Fall       HPI    59-year-old female presents ER with complaint of left knee and left ankle pain.  Patient states she accidentally lost her footing last night and fell down 10 stairs.  Patient denies hitting her head.  Patient states he is able to bear weight but with pain.  Patient is taking aspirin for the pain with no relief    History reviewed. Past Medical History[1]    History reviewed. Past Surgical History[2]      Medications :  Prescriptions Prior to Admission[3]     Family History[4]    Smoking Status: Social Hx on file[5]    ROS  All pertinent positives for the review of systems are mentioned in the HPI  All other organ systems are reviewed and are negative.    Constitutional and vital signs reviewed.      Social History and Family History elements reviewed from today, pertinent positives to the presenting problem noted.    Physical Exam     ED Triage Vitals [04/30/25 1756]   /75   Pulse 59   Resp 20   Temp 98.3 °F (36.8 °C)   Temp src Oral   SpO2 99 %   O2 Device None (Room air)       All measures to prevent infection transmission during my interaction with the patient were taken. The patient was already wearing a droplet mask on my arrival to the room. Personal protective equipment including droplet mask, eye protection, and gloves were worn throughout the duration of the exam.  Handwashing was performed prior to and after the exam.  Stethoscope and any equipment used during my examination was cleaned with super sani-cloth germicidal wipes following the exam.     Physical Exam  Vitals and nursing note reviewed.   Constitutional:       Appearance: She is well-developed.   HENT:      Head: Normocephalic and atraumatic.      Right Ear: External ear normal.      Left Ear: External ear normal.      Nose: Nose normal.   Eyes:      Conjunctiva/sclera: Conjunctivae normal.       Pupils: Pupils are equal, round, and reactive to light.   Cardiovascular:      Rate and Rhythm: Normal rate and regular rhythm.      Heart sounds: Normal heart sounds.   Pulmonary:      Effort: Pulmonary effort is normal.      Breath sounds: Normal breath sounds.   Abdominal:      General: Bowel sounds are normal.      Palpations: Abdomen is soft.   Musculoskeletal:         General: Tenderness and signs of injury present. Normal range of motion.      Cervical back: Normal range of motion and neck supple.      Comments: Positive tenderness to the medial aspect of the left knee as well as decreased range of motion.  Positive swelling to the left ankle   Skin:     General: Skin is warm and dry.   Neurological:      Mental Status: She is alert and oriented to person, place, and time.      Deep Tendon Reflexes: Reflexes are normal and symmetric.   Psychiatric:         Behavior: Behavior normal.         Thought Content: Thought content normal.         Judgment: Judgment normal.         ED Course      Labs Reviewed - No data to display      Imaging Results Available and Reviewed while in ED: XR KNEE (1 OR 2 VIEWS), LEFT (CPT=73560)  Result Date: 4/30/2025  CONCLUSION:  1. No acute fracture or subluxation.    Dictated by (CST): Julito Lemus MD on 4/30/2025 at 7:47 PM     Finalized by (CST): Julito Lemus MD on 4/30/2025 at 7:49 PM          XR ANKLE (MIN 3 VIEWS), LEFT (CPT=73610)  Result Date: 4/30/2025  CONCLUSION:  1. No acute ankle fracture.  Soft tissue swelling around the ankle. 2. Os ossific density dorsal to head of talus may represent an old avulsion fracture or accessory ossicle. 3. Probable accessory ossicle lateral midfoot seen at the edge of the field of view.  If patient has directed tenderness in this region, dedicated imaging of foot could be more definitive.    Dictated by (CST): Julito Lemus MD on 4/30/2025 at 7:43 PM     Finalized by (CST): Julito Lemus MD on 4/30/2025 at 7:47 PM          ED  Medications Administered:   Medications   ibuprofen (Motrin) tab 600 mg (600 mg Oral Given 4/30/25 1841)         MDM     Vitals:    04/30/25 1756 04/30/25 1918   BP: 109/75 134/70   Pulse: 59 58   Resp: 20 18   Temp: 98.3 °F (36.8 °C)    TempSrc: Oral    SpO2: 99% 97%   Weight: 99.8 kg    Height: 162.6 cm (5' 4\")      *I personally reviewed and interpreted all ED vitals.  I also personally reviewed all labs and imaging if ordered    Pulse Ox: 99%, Room air, Normal     Monitor Interpretation:   normal sinus rhythm    Differential Diagnosis/ Diagnostic Considerations: Ankle pain, ankle contusion, ankle fracture, ankle sprain, knee pain, knee contusion    Medical Record Review: I personally reviewed available prior medical records for any recent pertinent discharge summaries, testing, and procedures and reviewed those reports.    Complicating Factors: The patient already has does not have any pertinent problems on file. to contribute to the complexity of this ED evaluation.    Medical Decision Making  59-year-old female presents ER with complaint of left knee and left ankle pain status post fall.  Patient's left knee and left ankle x-ray showed no acute fracture.  Patient given follow-up with Ortho and instructed to elevate her leg take ibuprofen for pain.  Patient placed in Ace wrap for both ankle and knee.  This daughter bedside made aware of the discharge planning and disposition    Problems Addressed:  Contusion of left knee, initial encounter: acute illness or injury  Mild sprain of left ankle, initial encounter: acute illness or injury    Amount and/or Complexity of Data Reviewed  Radiology: ordered and independent interpretation performed. Decision-making details documented in ED Course.     Details: Left ankle x-ray reviewed by myself shows no acute fracture or subluxation    Risk  Prescription drug management.        Condition upon leaving the department: Stable    Disposition and Plan     Clinical  Impression:  1. Contusion of left knee, initial encounter    2. Mild sprain of left ankle, initial encounter        Disposition:  Discharge    Follow-up:  Michi Crowder MD  [address] [place] [id number]  [phone]    Schedule an appointment as soon as possible for a visit  If symptoms worsen      Medications Prescribed:  Current Discharge Medication List        START taking these medications    Details   ibuprofen 600 MG Oral Tab Take 1 tablet (600 mg total) by mouth every 8 (eight) hours as needed.  Qty: 14 tablet, Refills: 0                      [1]   Past Medical History:   Arrhythmia    Fibromyalgia    Irritable bowel syndrome    Migraines   [2]   Past Surgical History:  Procedure Laterality Date    Breast surgery      Colonoscopy      Total abdom hysterectomy     [3] (Not in a hospital admission)   [4]   Family History  Problem Relation Age of Onset    Cancer Paternal Grandmother         stomach cancer   [5]   Social History  Socioeconomic History    Marital status: Single   Tobacco Use    Smoking status: Never    Smokeless tobacco: Never   Vaping Use    Vaping status: Never Used   Substance and Sexual Activity    Alcohol use: Not Currently     Comment: socially     Drug use: Never

## 2025-04-30 NOTE — ED INITIAL ASSESSMENT (HPI)
Per pt she was going down the stairs, legs gave out and fell about 10 stairs happened last night.  Pt with left leg pain , left knee, left ankle and left foot pain.  Pt denies dizziness before the fall..  Denies head injury/LOC.  Per pt she takes Aspirin 325 daily.  Pt is A/OX 4, breathing unlabored.

## 2025-05-01 NOTE — ED QUICK NOTES
Pt reporting cramping in ball of right foot. Report she usually takes ASA at home for it. Pt requesting ASA. MD notified.

## 2025-05-05 ENCOUNTER — NURSE TRIAGE (OUTPATIENT)
Dept: INTERNAL MEDICINE CLINIC | Facility: CLINIC | Age: 60
End: 2025-05-05

## 2025-05-05 NOTE — TELEPHONE ENCOUNTER
Action Requested: Summary for Provider     []  Critical Lab, Recommendations Needed  [] Need Additional Advice  []   FYI    []   Need Orders  [] Need Medications Sent to Pharmacy  []  Other     SUMMARY: Office visit. Tried to schedule an appointment but call was disconnected. Tried to call patient back and no answer.     Reason for call: Breathing Problem  Onset: Data Unavailable    Patient was seen in ER on 4/30/25 for leg pain and a fall. She states the swelling is worse. She is having difficulty breathing because now she has back pain. She feels pain when she takes a deep breath in. She fell on her right side.     Reason for Disposition   Patient wants to be seen    Protocols used: Breathing Difficulty-A-OH

## 2025-05-07 NOTE — TELEPHONE ENCOUNTER
Attempted to call patient on both phone numbers listed-messages left.  [employer] has not been used since 09/2024  Attempted to call her 3 daughters listed on JOHN-the first 2 numbers were the wrong phone numbers.     The 3rd daughter listed was Amarilis Kilgore, she answered the phone and confirmed the patient is okay. Informed if the patient needs an appt to call us back.     Closing this encounter.

## 2025-05-12 ENCOUNTER — LAB ENCOUNTER (OUTPATIENT)
Dept: LAB | Facility: HOSPITAL | Age: 60
End: 2025-05-12
Attending: INTERNAL MEDICINE
Payer: MEDICARE

## 2025-05-12 ENCOUNTER — HOSPITAL ENCOUNTER (OUTPATIENT)
Dept: GENERAL RADIOLOGY | Facility: HOSPITAL | Age: 60
Discharge: HOME | End: 2025-05-12
Attending: INTERNAL MEDICINE
Payer: MEDICARE

## 2025-05-12 ENCOUNTER — OFFICE VISIT (OUTPATIENT)
Age: 60
End: 2025-05-12

## 2025-05-12 VITALS
HEART RATE: 53 BPM | WEIGHT: 225 LBS | BODY MASS INDEX: 38.41 KG/M2 | SYSTOLIC BLOOD PRESSURE: 135 MMHG | HEIGHT: 64 IN | DIASTOLIC BLOOD PRESSURE: 68 MMHG

## 2025-05-12 DIAGNOSIS — M25.561 CHRONIC PAIN OF RIGHT KNEE: ICD-10-CM

## 2025-05-12 DIAGNOSIS — M79.672 LEFT FOOT PAIN: ICD-10-CM

## 2025-05-12 DIAGNOSIS — R29.6 FREQUENT FALLS: ICD-10-CM

## 2025-05-12 DIAGNOSIS — M79.7 FIBROMYALGIA: Primary | ICD-10-CM

## 2025-05-12 DIAGNOSIS — G89.29 CHRONIC PAIN OF RIGHT KNEE: ICD-10-CM

## 2025-05-12 LAB — CK SERPL-CCNC: 149 U/L (ref 34–145)

## 2025-05-12 PROCEDURE — G2211 COMPLEX E/M VISIT ADD ON: HCPCS | Performed by: INTERNAL MEDICINE

## 2025-05-12 PROCEDURE — 82550 ASSAY OF CK (CPK): CPT

## 2025-05-12 PROCEDURE — 36415 COLL VENOUS BLD VENIPUNCTURE: CPT

## 2025-05-12 PROCEDURE — 73590 X-RAY EXAM OF LOWER LEG: CPT | Performed by: INTERNAL MEDICINE

## 2025-05-12 PROCEDURE — 99214 OFFICE O/P EST MOD 30 MIN: CPT | Performed by: INTERNAL MEDICINE

## 2025-05-12 PROCEDURE — 73630 X-RAY EXAM OF FOOT: CPT | Performed by: INTERNAL MEDICINE

## 2025-05-12 RX ORDER — DULOXETIN HYDROCHLORIDE 20 MG/1
20 CAPSULE, DELAYED RELEASE ORAL DAILY
Qty: 90 CAPSULE | Refills: 1 | Status: SHIPPED | OUTPATIENT
Start: 2025-05-12

## 2025-05-12 NOTE — PROGRESS NOTES
Bailee Kilgore is a 59 year old female.    HPI:     Chief Complaint   Patient presents with    Fibromyalgia Syndrome    Leg Pain     Left        I had the pleasure of seeing Bailee Kilgore on 5/12/2025 for follow up Fibromyalgia     Current Medications:  Cymbalta 20 mg daily- started 2/2024  Past medications:  Flexeril- caused drowsiness   Gabapentin- had a reaction  Lyrica- swelling and weight gain, it did help  Blood work:  +JANAY 1:80, Neg TRENTON panel  Neg ESR, CRP, RF, CCP, HLA-B27    Interval History:  This is a 57 yo F with hx of IBS, Pre-DM, HLD, CAD s/p angioplasty in 2015, Fatty liver, Migraine's presents to establish care for Fibromyalgia.  She reports chronic joint and muscle pain diffusely.  When she wakes up in the morning she has significant pain in her feet, she states they swell.  It is hard to walk in the morning due to the pain in her feet.  Her legs give out.  She fell down the stairs yesterday as her legs gave out.  Her legs will also swell.  Last year she fell 3 times.  She feels electric shocks in her legs that have been for a few seconds and then her legs gave out.  She states that her right knee can swell.  She has sciatica on her right side intermittently.  At times she has to drag her right leg due to the pain.  She also has restless leg syndrome.  She reports a lot of pain in her shoulders, shoulder blade and trapezius region.  She has pain in her hands.  She was worked up for lupus in the past and was negative.  She states that lupus and MS run in her family.  She reports a history of eczema.  She reports dry eyes and dry mouth.  Denies any history of serositis, DVT or PE, miscarriages, RP.  She reports memory issues, brain fog.  She was on Cymbalta and gabapentin in the past but had side effects.  She also tried Flexeril but did not help.  Her main issue is joint pain involving her right knee and significant pain in both feet.    11/12/2024:  Presents for follow-up of  fibromyalgia  She is now on Cymbalta 20 mg but was taking as needed not daily  It did help help some of her pain  Having worsening right knee pain with swelling. She was given a PT referral but was not able to do it  Also has lower back pain  She currently has a UTI and finished Abx last week    5/12/2025:  Presents for follow-up of fibromyalgia  She was on cymbalta 20 mg daily but ran out of prescription   Helped minimally   Last visit right knee was injected with cortisone but made it worse  She fell down 10 stairs about 2 weeks ago. She is having left knee and ankle pain. Continues to have pain in left foot and even the left lateral shin region  Having some pain in right shoulder after the fall  Continue to have lower back pain   She is ibuprofen 2 pills a day, helps mildly   She went to the ED due to fall, xray of left ankle showing soft tissue swelling  Xray of left knee it was normal             HISTORY:  Past Medical History:    Arrhythmia    Fibromyalgia    Irritable bowel syndrome    Migraines      Social Hx Reviewed   Family Hx Reviewed     Medications (Active prior to today's visit):  Current Outpatient Medications   Medication Sig Dispense Refill    DULoxetine 20 MG Oral Cap DR Particles Take 1 capsule (20 mg total) by mouth daily. 90 capsule 1    pantoprazole 40 MG Oral Tab EC Take 1 tablet (40 mg total) by mouth every morning before breakfast. (Patient not taking: Reported on 5/12/2025) 90 tablet 3    Na Sulfate-K Sulfate-Mg Sulf (SUPREP BOWEL PREP KIT) 17.5-3.13-1.6 GM/177ML Oral Solution Take as directed (Patient not taking: Reported on 5/12/2025) 1 each 0    fluconazole 150 MG Oral Tab Take 1 tablet (150 mg total) by mouth. (Patient not taking: Reported on 5/12/2025)      fluPHENAZine 5 MG Oral Tab Take 1 tablet (5 mg total) by mouth 2 (two) times daily. (Patient not taking: Reported on 5/12/2025)      benztropine 0.5 MG Oral Tab Take 1 tablet (0.5 mg total) by mouth every 4 (four) hours as needed.  (Patient not taking: Reported on 5/12/2025)      cyclobenzaprine 5 MG Oral Tab Take 1 tablet (5 mg total) by mouth nightly as needed for Muscle spasms. 30 tablet 2    cholecalciferol (VITAMIN D3) 10 MCG (400 UNIT) Oral Tab Take 1 tablet (400 Units total) by mouth daily.      fluticasone propionate 50 MCG/ACT Nasal Suspension 1 spray by Nasal route daily. (Patient not taking: Reported on 5/12/2025)      aspirin 81 MG Oral Chew Tab Chew 1 tablet (81 mg total) by mouth daily.       .cmed  Allergies:  Allergies[1]      ROS:   All other ROS are negative.     PHYSICAL EXAM:   GEN: AAOx3, NAD  HEENT: EOMI, PERRLA, no injection or icterus, oral mucosa moist, no oral lesions. No lymphadenopathy. No facial rash  CVS: RRR, no murmurs rubs or gallops. Equal 2+ distal pulses.   LUNGS: CTAB, no increased work of breathing  ABDOMEN:  soft NT/ND, +BS, no HSM  SKIN: No rashes or skin lesions. No nail findings  MSK:  No synovitis or swelling on exam  Left ankle swelling, TTP  Full range of motion in the shoulders  TTP in the paraspinal region and trapezius region  NEURO: Cranial nerves II-XII intact grossly. 5/5 strength throughout in both upper and lower extremities, sensation intact.  PSYCH: normal mood       LABS:     Component      Latest Ref Rng 2/20/2024   Anti-SSA Antibody, IGG      <7 U/mL <0.4    Anti-SSB Antibody, IGG      <7 U/mL <0.4    Anti-Smith Antibody, IGG      <7 U/mL <0.7    Anti-U1RNP Antibody, IGG      <5 U/mL 1.0    Anti-RNP70 Antibody, IGG      <7 U/mL 1.1    Anti-Centromere Antibody, IGG      <7 U/mL <0.4    Anti-SCL70 Antibody, IGG      <7 U/mL <0.6    Anti-Pearl-1 Antibody, IGG      <7 U/mL <0.3    JANAY Titer/Pattern      <80  80 !    Reviewed By: Kelli Hodges M.D.    C-REACTIVE PROTEIN      <1.00 mg/dL <0.40    SED RATE      0 - 30 mm/Hr 18    JANAY SCREEN WITH REFLEX (S)      Negative  Positive !    RHEUMATOID FACTOR      <14 IU/mL <10    C-Citrullinated Peptide IgG AB      0.0 - 6.9 U/mL 1.3    HLA-B27  Negative    Anti Double Strand DNA      <10  <10         Imaging:     XR right knee 10/2023:  No acute osseous process.   Mild to moderate tricompartmental osteoarthritis.     ASSESSMENT/PLAN:     Diffuse myofascial pain, polyarthralgias  - She reports significant muscle pain in her paraspinal region and trapezius region.  Also has a lot of joint pain involving her right knee and feet  - No evidence of synovitis or swelling on exam.  No history of psoriasis  - She was on gabapentin, Lyrica and Flexeril in the past but had side effects  - She is on Cymbalta 20 mg, helping slightly but does not want to increase the medication    Left ankle pain and swelling, recent fall  - xray of left ankle showed soft tissue swelling  - no acute fracture, did mention a possible old avulsion fracture  - Having pain in her left calf and foot, will obtain x-ray    Frequent falls  - Will refer to neurology    Right knee pain secondary to osteoarthritis  - Right knee was injected with cortisone November 2024, helped minimally    Positive JANAY  - She was found to have a positive JANAY 1: 80 but negative TRENTON panel.  Symptoms are not consistent with a connective tissue disease  - I explained that the JANAY is a non-specific test and does not necessarily indicate a diagnosis of autoimmune rheumatologic diesease. For example, autoantibodies also are detected in patients with organ-specific autoimmune diseases, such as autoimmune thyroiditis and hepatitis, certain infections such as hepatitis, and malignancies. In general, 20-30% of normal patients may have an JANAY titer of 1:40, 10-12% may have a titer of 1:80, and about 5% may have a titer of greater than or equal to 1:160     Pt will f/u in 6 mos    There is a longitudinal care relationship with me, the care plan reflects the ongoing nature of the continuous relationship of care, and the medical record indicates that there is ongoing treatment of a serious/complex medical condition which I am  currently managing.  is Applicable.     Tory Simmons MD  5/12/2025  9:51 AM                 [1]   Allergies  Allergen Reactions    Haloperidol NAUSEA AND VOMITING    Ziprasidone ITCHING    Zolmitriptan NAUSEA ONLY

## 2025-05-12 NOTE — PATIENT INSTRUCTIONS
You were seen today for diffuse muscle pain, fibromyalgia  Also left leg, knee and ankle/foot pain due to recent fall  Continue Cymbalta 20 mg daily  Lets get x-rays of the left foot and leg

## 2025-05-13 ENCOUNTER — TELEPHONE (OUTPATIENT)
Age: 60
End: 2025-05-13

## 2025-05-13 PROBLEM — R29.6 FREQUENT FALLS: Status: ACTIVE | Noted: 2025-05-13

## 2025-05-13 PROBLEM — M79.672 LEFT FOOT PAIN: Status: ACTIVE | Noted: 2025-05-13

## 2025-07-02 ENCOUNTER — TELEPHONE (OUTPATIENT)
Dept: INTERNAL MEDICINE CLINIC | Facility: CLINIC | Age: 60
End: 2025-07-02

## (undated) NOTE — ED AVS SNAPSHOT
[facility] Emergency Department    [address]     [place] [address]    Phone:  [phone]    Fax:  [phone]           Jose Guadalupe Joseph   MRN: [id number]    Department:  [facility] Emergency Department   Date of Visit: and Class Registration line at [phone] or find a doctor online by visiting [email or web address].    IF THERE IS ANY CHANGE OR WORSENING OF YOUR CONDITION, CALL YOUR PRIMARY CARE PHYSICIAN AT ONCE OR RETURN IMMEDIATELY TO [address] [place] [address].     If

## (undated) NOTE — Clinical Note
Thank you for the opportunity to participate in this patient's care. Discussed with patient that fibromyalgia requires a multi-disciplinary approach including formal physical therapy and symptoms-based treatment such as muscle relaxants in the case of muscle spasms or gabapentin in the case of neuropathy/radiculopathy. I have also seen trials of various NSAIDs to be helpful for some patients. This patient is complicated since gabapentin and pregabalin has already been trialed with noted side effect. NSAIDs should probably also be avoided given her history of CAD with angioplasty. Thank you.
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
